# Patient Record
Sex: FEMALE | Race: WHITE | NOT HISPANIC OR LATINO | Employment: FULL TIME | URBAN - METROPOLITAN AREA
[De-identification: names, ages, dates, MRNs, and addresses within clinical notes are randomized per-mention and may not be internally consistent; named-entity substitution may affect disease eponyms.]

---

## 2022-09-27 ENCOUNTER — OFFICE VISIT (OUTPATIENT)
Dept: FAMILY MEDICINE CLINIC | Facility: CLINIC | Age: 37
End: 2022-09-27

## 2022-09-27 VITALS
SYSTOLIC BLOOD PRESSURE: 118 MMHG | HEART RATE: 88 BPM | TEMPERATURE: 97.8 F | BODY MASS INDEX: 23.32 KG/M2 | OXYGEN SATURATION: 98 % | WEIGHT: 140 LBS | HEIGHT: 65 IN | DIASTOLIC BLOOD PRESSURE: 70 MMHG

## 2022-09-27 DIAGNOSIS — J20.9 ACUTE BRONCHITIS, UNSPECIFIED ORGANISM: Primary | ICD-10-CM

## 2022-09-27 DIAGNOSIS — M54.2 NECK PAIN: ICD-10-CM

## 2022-09-27 DIAGNOSIS — R05.9 COUGH: ICD-10-CM

## 2022-09-27 PROCEDURE — FLEXERIL 10MG 21 FLEXERIL 10MG 21: Performed by: NURSE PRACTITIONER

## 2022-09-27 PROCEDURE — X34426: Performed by: NURSE PRACTITIONER

## 2022-09-27 PROCEDURE — 99204 OFFICE O/P NEW MOD 45 MIN: CPT | Performed by: NURSE PRACTITIONER

## 2022-09-27 RX ORDER — SARECYCLINE HYDROCHLORIDE 100 MG/1
10 TABLET, COATED ORAL DAILY
COMMUNITY
Start: 2022-09-15

## 2022-09-27 RX ORDER — LORATADINE 10 MG/1
10 TABLET ORAL DAILY
COMMUNITY

## 2022-09-27 RX ORDER — CYCLOBENZAPRINE HCL 10 MG
10 TABLET ORAL
Qty: 21 TABLET | Refills: 0
Start: 2022-09-27

## 2022-09-27 RX ORDER — AZITHROMYCIN 250 MG/1
TABLET, FILM COATED ORAL
Qty: 6 TABLET | Refills: 0
Start: 2022-09-27 | End: 2022-10-02

## 2022-09-27 RX ORDER — BENZONATATE 100 MG/1
100 CAPSULE ORAL 3 TIMES DAILY PRN
Qty: 20 CAPSULE | Refills: 0 | Status: SHIPPED | OUTPATIENT
Start: 2022-09-27

## 2022-09-27 NOTE — PATIENT INSTRUCTIONS
Please also take advil (2 tab) every 6-8 hours for 1-2 weeks for neck pain    Please follow-up at our office if symptoms fail to improve after 3 weeks of treatment

## 2022-09-27 NOTE — PROGRESS NOTES
Assessment/Plan:     Presentation consistent with acute bronchitis secondary to URI, viral vs bacterial  Due to length of symptoms and worsening progression, will treat as bacterial with azithromycin x 5 days  Tessalon perles also ordered for cough relief  Neck pain likely musculoskeletal in origin per presentation, no radiculopathy concerns  Acute cough may have caused or be contributing to condition  Flexeril 10mg given to patient form office pharmacy to be taken nighty for 2-3 weeks in addition to OTC advil prn  Encouraged patient to f/u with our office if this trial of NSAIDs + muscle relaxer fails to work  Discussed what signs and symptoms warrant emergent medical care  Patient verbalized understanding  Diagnoses and all orders for this visit:    Acute bronchitis, unspecified organism  -     azithromycin (Zithromax) 250 mg tablet; Take 2 tablets (500 mg total) by mouth daily for 1 day, THEN 1 tablet (250 mg total) daily for 4 days  Cough  -     benzonatate (TESSALON PERLES) 100 mg capsule; Take 1 capsule (100 mg total) by mouth 3 (three) times a day as needed for cough    Neck pain  -     cyclobenzaprine (FLEXERIL) 10 mg tablet; Take 1 tablet (10 mg total) by mouth daily at bedtime    Other orders  -     Seysara 100 MG TABS; Take 10 mg by mouth in the morning  -     loratadine (CLARITIN) 10 mg tablet; Take 10 mg by mouth daily        Subjective:     Patient ID: Rene Riley is a 40 y o  female  1 5 weeks ago: sore throat and cough  Sore throat gone but still has persistent cough  This past Friday develop neck pain (posterior), unchanged since onset  Did 3 covid test last week, all negative    Neck Pain  Patient complains of neck pain  Event that precipitated these symptoms: none known  Onset of symptoms 4 days ago, and have been unchanged since that time  Current symptoms are pain in posterior neck (aching in character; 5/10 in severity)  Patient denies numbness in UEs b/l   Patient has had no prior neck problems  Previous treatments include: none and massage, no help  Review of Systems   Constitutional: Positive for fatigue  HENT: Positive for postnasal drip  Negative for congestion, sinus pressure, sinus pain and sore throat  Respiratory: Positive for cough  Non productive cough   Cardiovascular: Negative  Gastrointestinal: Negative  Endocrine: Negative  Genitourinary: Negative  Musculoskeletal: Positive for neck pain and neck stiffness  Skin: Negative  Allergic/Immunologic: Negative  Neurological: Negative  Hematological: Positive for adenopathy  Psychiatric/Behavioral: Negative  Objective:     Physical Exam  Constitutional:       General: She is not in acute distress  Appearance: Normal appearance  HENT:      Right Ear: Tympanic membrane normal       Left Ear: Tympanic membrane normal       Nose: Nose normal  No congestion  Mouth/Throat:      Mouth: Mucous membranes are moist       Pharynx: No oropharyngeal exudate or posterior oropharyngeal erythema  Neck:      Comments: ROM was normal but patient reports pain with motion  No tenderness  Cardiovascular:      Rate and Rhythm: Normal rate and regular rhythm  Pulses: Normal pulses  Heart sounds: Normal heart sounds  Pulmonary:      Effort: Pulmonary effort is normal       Breath sounds: No wheezing  Musculoskeletal:         General: Normal range of motion  Cervical back: Normal range of motion  No rigidity or tenderness  Lymphadenopathy:      Cervical: Cervical adenopathy present  Skin:     General: Skin is warm and dry  Findings: No rash  Neurological:      Mental Status: She is alert and oriented to person, place, and time  Cranial Nerves: No cranial nerve deficit  Sensory: No sensory deficit  Motor: No weakness        Coordination: Coordination normal    Psychiatric:         Mood and Affect: Mood normal          Behavior: Behavior normal

## 2022-11-04 ENCOUNTER — OFFICE VISIT (OUTPATIENT)
Dept: FAMILY MEDICINE CLINIC | Facility: CLINIC | Age: 37
End: 2022-11-04

## 2022-11-04 VITALS
WEIGHT: 145 LBS | RESPIRATION RATE: 16 BRPM | OXYGEN SATURATION: 98 % | DIASTOLIC BLOOD PRESSURE: 74 MMHG | SYSTOLIC BLOOD PRESSURE: 122 MMHG | BODY MASS INDEX: 24.16 KG/M2 | HEART RATE: 74 BPM | HEIGHT: 65 IN

## 2022-11-04 DIAGNOSIS — J01.00 SUBACUTE MAXILLARY SINUSITIS: Primary | ICD-10-CM

## 2022-11-04 DIAGNOSIS — J30.1 NON-SEASONAL ALLERGIC RHINITIS DUE TO POLLEN: ICD-10-CM

## 2022-11-04 RX ORDER — SULFAMETHOXAZOLE AND TRIMETHOPRIM 800; 160 MG/1; MG/1
1 TABLET ORAL EVERY 12 HOURS SCHEDULED
Qty: 20 TABLET | Refills: 0 | Status: SHIPPED | OUTPATIENT
Start: 2022-11-04 | End: 2022-11-14

## 2022-11-04 RX ORDER — PREDNISONE 20 MG/1
TABLET ORAL
Qty: 15 TABLET | Refills: 0 | Status: SHIPPED | OUTPATIENT
Start: 2022-11-04

## 2022-11-04 NOTE — PROGRESS NOTES
[id number]         NAME: Alma Panchal is a 40 y o  female  : 1985    MRN: [id number]  DATE: 2022  TIME: 10:37 AM    Assessment and Plan   Subacute maxillary sinusitis [J01 00]  1  Subacute maxillary sinusitis  sulfamethoxazole-trimethoprim (BACTRIM DS) 800-160 mg per tablet    predniSONE 20 mg tablet   2  Non-seasonal allergic rhinitis due to pollen  predniSONE 20 mg tablet       No problem-specific Assessment & Plan notes found for this encounter  Patient Instructions     Patient Instructions   Continue Claritin once daily  Will use Bactrim twice daily for 10 days along with prednisone as ordered  Chief Complaint     Chief Complaint   Patient presents with   • Cold Like Symptoms     Cough, sinus congestion, headache since September- Covid Negative (2022)         History of Present Illness       Patient comes in today for persistent upper respiratory symptoms  Symptoms have been present over the last 5-6 weeks  Was seen urgent care in [facility] back in September  She did have some temporary relief  She now continues to have congestion  Does take Claritin on a daily basis for allergies  This seems worsen a normal allergic symptoms      Review of Systems   Review of Systems   Constitutional: Negative for chills and fever  HENT: Positive for postnasal drip, rhinorrhea and sore throat  Negative for ear pain  Respiratory: Positive for cough and wheezing  Negative for shortness of breath  Cardiovascular: Negative for chest pain  Musculoskeletal: Negative for myalgias  Skin: Negative for rash  Neurological: Positive for headaches           Current Medications       Current Outpatient Medications:   •  benzonatate (TESSALON PERLES) 100 mg capsule, Take 1 capsule (100 mg total) by mouth 3 (three) times a day as needed for cough, Disp: 20 capsule, Rfl: 0  •  loratadine (CLARITIN) 10 mg tablet, Take 10 mg by mouth daily, Disp: , Rfl:   •  predniSONE 20 mg tablet, 1 tab twice daily for 5 days, then 1 tab daily for 5 days  , Disp: 15 tablet, Rfl: 0  •  sulfamethoxazole-trimethoprim (BACTRIM DS) 800-160 mg per tablet, Take 1 tablet by mouth every 12 (twelve) hours for 10 days, Disp: 20 tablet, Rfl: 0    Current Allergies     Allergies as of 11/04/2022 - Reviewed 11/04/2022   Allergen Reaction Noted   • Amoxicillin Rash 09/27/2022            The following portions of the patient's history were reviewed and updated as appropriate: allergies, current medications, past family history, past medical history, past social history, past surgical history and problem list      Past Medical History:   Diagnosis Date   • Allergic 2000   • Headache(784 0) 2000       History reviewed  No pertinent surgical history  Family History   Problem Relation Age of Onset   • Heart disease Maternal Grandfather    • Diabetes Paternal Grandmother    • Cancer Paternal Grandmother          Medications have been verified  Objective   /74   Pulse 74   Resp 16   Ht 5' 5" (1 651 m)   Wt 65 8 kg (145 lb)   LMP 10/10/2022   SpO2 98%   BMI 24 13 kg/m²        Physical Exam     Physical Exam  Vitals reviewed  Constitutional:       General: She is not in acute distress  Appearance: She is well-developed  She is not ill-appearing  HENT:      Head: Normocephalic and atraumatic  Right Ear: Tympanic membrane and external ear normal  No drainage  Left Ear: Tympanic membrane normal  No drainage  Nose: Congestion present  Mouth/Throat:      Pharynx: No oropharyngeal exudate or posterior oropharyngeal erythema  Eyes:      General:         Right eye: No discharge  Left eye: No discharge  Extraocular Movements: Extraocular movements intact  Conjunctiva/sclera: Conjunctivae normal       Pupils: Pupils are equal, round, and reactive to light  Neck:      Thyroid: No thyromegaly     Cardiovascular:      Rate and Rhythm: Normal rate and regular rhythm  Heart sounds: Normal heart sounds  Pulmonary:      Effort: Pulmonary effort is normal  No respiratory distress  Breath sounds: No wheezing or rales  Musculoskeletal:      Cervical back: Normal range of motion and neck supple  Lymphadenopathy:      Cervical: No cervical adenopathy  Skin:     Findings: No rash  Neurological:      Mental Status: She is alert

## 2022-11-04 NOTE — PATIENT INSTRUCTIONS
Continue Claritin once daily  Will use Bactrim twice daily for 10 days along with prednisone as ordered

## 2023-04-27 ENCOUNTER — OFFICE VISIT (OUTPATIENT)
Dept: DERMATOLOGY | Facility: CLINIC | Age: 38
End: 2023-04-27

## 2023-04-27 VITALS — HEIGHT: 65 IN | TEMPERATURE: 98 F | BODY MASS INDEX: 24.66 KG/M2 | WEIGHT: 148 LBS

## 2023-04-27 DIAGNOSIS — L73.0 ACNE SCARRING: ICD-10-CM

## 2023-04-27 DIAGNOSIS — L70.0 ACNE VULGARIS: Primary | ICD-10-CM

## 2023-04-27 RX ORDER — SPIRONOLACTONE 100 MG/1
100 TABLET, FILM COATED ORAL DAILY
Status: CANCELLED | OUTPATIENT
Start: 2023-04-27

## 2023-04-27 NOTE — PATIENT INSTRUCTIONS
"ACNE VULGARIS (\"COMMON ACNE\")      Assessment and Plan: We reviewed the causes of acne, the “kinds” of acne, and the expected clinical course  We discussed treatment options ranging from over-the-counter products, topical retinoids, antibiotics, BP, hormonal therapies (OCPs/spironolactone), and isotretinoin (Accutane)  We reviewed specific over-the-counter interventions and medications  Recommended typical hygiene measures including water-based facial products, washing regularly with mild cleanser, and refraining from picking and popping any pimples  Recommended non-comedogenic sunscreen use daily  Expectations of therapy discussed  Side effects, risks and benefits of medications discussed  A comprehensive handout on Acne was provided  The phone number to call in case of questions or concerns (and instructions to stop medications in such a scenario) was provided  After lengthy discussion of etiology and treatment options, we decided to implement the following personalized treatment plan:  Recommended starting topical compound cream prescribed through [email or web address]   [facility]     We use this service to prescribe medications that are often not covered by insurance  Your physician will send your prescription to the pharmacy  You will receive an email from [email or web address] where you will follow the instructions within the email to provide your billing and shipping information  Your medicine will be shipped directly to you  If you have any questions, you can call [phone] or email [email or web address]  Discussed option of Spironolactone with risks and benefits reviewed- to avoid excessive amounts of potassium in diet  Complete lab work      Based on a thorough discussion of this condition and the management approach to it (including a comprehensive discussion of the known risks, side effects and potential benefits of treatment), the patient (family) agrees to implement the " following specific plan:    --------------------------------------------------------------------------------------  YOUR PERSONALIZED ACNE ACTION PLAN    “MORNING ROUTINE”    SKIN HYGIENE:  In the shower, wash your face, chest and back gently with Cetaphil moisturizing cleanser or Dove Fragrance-free bar  Do not use a luffa or washcloth as these tend to be too irritating to acne-prone skin  “EVENING ROUTINE”    SKIN HYGIENE:  In the shower, wash your face, chest and back gently with Cetaphil moisturizing cleanser or Dove Fragrance-free bar  Do not use a lufa or washcloth as these tend to be too irritating to acne-prone skin  TOPICAL CREAM to apply as directed        REMEMBER:  Always take your acne pills with lots of water! A pill stuck in your throat can cause significant burning and irritation  Drink a full glass of water to ensure the pill gets into your stomach  Avoid “popping” a pill right before bed, and stay upright for at least 1 hour after taking a pill  ACNE:  WHAT ZIT ALL ABOUT? WHY DO I HAVE ACNE/PIMPLES? Your skin is made of layers  To keep the skin from becoming dry and cracked, the skin needs oil  The oil is made in little wells in the deeper layers in the skin  People with acne have glands that make more oil and are more easily plugged, causing the glands to swell  Hormones, bacteria and your inherited tendency to have acne all play a role  The medical term for “pimples” is acne or acne vulgaris (vulgaris means “common”)  Most people get some acne  Acne does not come from being dirty  Instead, it is an expected consequence of changes that occur during normal growth and development  Hormones, bacteria, and your family's tendency to have acne may all play a role  “Whiteheads” or “blackheads” are openings of the glands (glands are the oil factories) onto the surface of the skin   “Blackheads” are not caused by dirt blocking the pores; instead, they result from the oxidation reaction of oil and skin in the pores with the air (like a “rust” reaction)  WHAT ABOUT STRESS? Stress does not “cause” acne but it can make it worse  Make sure you get enough sleep and daily exercise! WHAT ABOUT FOODS/DIET? Try to eat a balanced, healthy diet  Some people feel that certain foods worsen their acne  While there aren't many studies available on this question, severe dietary changes are unlikely to help your acne and may be harmful to the health of your skin  If you find that a certain food seems to aggravate your acne, you may consider avoiding that food  Discuss this with your physician! WHAT CAUSES MY ACNE? There are four contributors to acne--the body's natural oil (sebum), clogged pores, bacteria (with the scientific name Propionibacterium acnes, or P  acnes, for short), and the body's reaction to the bacteria living in the clogged pores (which causes inflammation)  Here's what happens:    Sebum is produced in the normal oil-making glands in the deeper layers of the skin and reaches the surface through the skin's pores  An increase in certain hormones occurs around the time of puberty, and these hormones trigger the oil glands to produce increased amounts of sebum  Pores with excess oil tend to become clogged more easily  At the same time, P  acnes--one of the many types of bacteria that normally live on everyone's skin--thrives in the excess oil and causes a skin reaction (inflammation)  If a pore is clogged close to the surface, there is little inflammation  However, this results in the formation of “whiteheads” (closed comedones) or “blackheads” (open comedones) at the surface of the skin  A plug that extends to, or forms a little deeper in the pore, or one that enlarges or ruptures may cause more inflammation  The result is red bumps (papules) and pus-filled pimples (pustules)    If plugging happens in the deepest skin layer, the inflammation may be even more severe, resulting in the formation of nodules or cysts  When these types of acne heal, they may leave behind discolored areas or true scars  SKIN HYGIENE:  HOW SHOULD I [address] MY SKIN? Acne does not come from being dirty, however, washing your face is part of taking good care of your skin and will help keep your face clear  Good skin hygiene is, therefore, critical to support any acne treatment plan  Here are several specific suggestions for practicing good skin hygiene and keeping your skin looking its best:    You should wash acne-prone skin TWICE A DAY: Once in the morning and once in the evening  This does include any showers you take that day, so do not overdo it! Do not scrub the skin with a washcloth or loofah as these can irritate and inflame your acne  Acne does not come from “dirt”, so it is not necessary to scrub the skin clean  In fact, scrubbing may lead to dryness and irritation that makes the acne even worse and harder for patients to tolerate acne medications  Use a gentle facial moisturizing cleanser (Cetaphil Moisturizing Cleanser or Dove Fragrance-Free bar)  Avoid using soaps like Nichol Glover, Wilian Haskins [address], [address], or soft/liquid soaps as these products will dry your skin  Do not use any over-the-counter “acne washes” without your doctor's specific instruction to do so  These products often contain salicylic acid or benzoyl peroxide  These ingredients can be helpful in clearing oil from the skin and reducing bacteria, but they may also be drying and can add to irritation  Do not use exfoliating products with microbeads or brushes as these can cause irritation to the skin  Facials and other treatments to remove, squeeze, or “clean out” pores are not recommended  Manipulating the skin in this way can make acne worse and can lead to severe infections and/or scarring  It also increases the likelihood that the skin will not be able to tolerate acne medications     Try not to “pop pimples” or pick at your acne as this can delay healing and may result in scarring or skin color changes (“dark spots”) that are often more noticeable than the acne itself  Picking/popping acne can also cause a serious skin infection  Wash or change your pillow case once to twice a week, especially if you use products in your hair  Wash the skin as soon as possible after playing sports or other activities that cause a lot of sweating  Also, pay attention to how your sports equipment (shoulder pads, helmet strap, etc ) might be making your acne worse  When you use makeup, moisturizer, or sunscreen make sure that these products are labeled “non-comedogenic,” or “won't clog pores,” or “won't cause acne ”       SHOULD I TREAT MY ACNE? There are a number of other skin conditions that can look like acne  If there is any question about the diagnosis, then the person should be evaluated by a board certified pediatric and adolescent dermatologist   A physician should examine any child with acne who is between the ages of 3and 9years of age, as acne in this “mid-childhood” age group is not normal and may signal an underlying problem  If a “preadolescent” (9to 6years of age) or “adolescent” (15to 25years of age) has mild acne and the condition is not bothersome to the individual, proper and regular skin care (what your doctor may call “skin hygiene”) may be all that is needed at this point  Many people do, however, need specific acne medications to help their skin look and feel its best  Your doctor will tell you if you are one of these people  If so, you may be advised to use an over-the-counter or prescription medication that is applied to the skin (a “topical medication”) or if the addition of an oral medication (a medication “taken by [employer]) is needed  The good news is that the medications work well when used properly! Some specific factors that may influence the choice of acne therapy include:    Severity   The number and type of skin lesions (papules or comedones) and the degree of inflammation (mild, moderate or severe)  Scarring  Scarring is most common when acne is severe, but it can happen even in children with mild acne  Impact  If a child is experiencing emotional complications because of the acne or is experiencing negative comments from other children  Cost of the acne medications  An acne expert can help to keep “out of pocket” costs to a minimum by utilizing the correct medications and the least expensive options  The patient's skin type (“oily” versus “dry” or “combination skin,” for example)  Potential side effects of the medication  The ease or overall complexity of the treatment plan or medication  WHAT ACNE TREATMENTS ARE AVAILABLE? Medications for acne try to stop the formation of new pimples by reducing or removing the oil, bacteria, and other things (like dead skin cells) that clog the pores  They can also decrease the inflammation or irritation response of the skin to bacteria  It may take from 6 to 8 weeks (about 2 months!) before you see any improvement and know if the medication is effective  It takes the layers of skin this long to regenerate  Remember, these medications do not “cure” the condition--the acne improves because of the medication  Therefore, treatment must be continued in order to prevent the return of acne lesions  There are many types of acne treatments  Some are applied to the skin (“topical” medications) and some are taken by mouth (“oral” medications)  In most cases of mild acne, the doctor will start with a topical medication  There are many different topical medications that are helpful for acne    If acne is more severe and it does not respond adequately to a topical medication, or if it covers large body surface areas such as the back and/or chest, oral antibiotics such as Doxycycline or Minocycline and/or oral hormone therapy such as Oral Contraceptive Pills or Spironolactone may be prescribed  In the most severe cases, isotretinoin (Accutane) may be used  In general, it is usually best to start with acne medications that are least likely to cause side effects but are at the same time capable of addressing the specific causes for the acne  Some patients have a good result with just one medication, but many will need to use a combination of treatments: two or more different topical agents or an oral medication plus a topical medication  Another treatment used for acne may include corticosteroid injections, which are used to help relieve pain, decrease the size, and encourage the healing of large, inflamed acne nodules  Also, dermatologists sometimes perform “acne surgery,” using a fine needle, a pointed blade, or an instrument known as a comedone extractor to mechanically clean out clogged pores  One must always weigh the risk for inducing a scar with the potential benefits of any procedure  Prior treatment with topical retinoids can “loosen” whiteheads and blackheads and make it easier to physically remove such lesions  Heat-based devices, and light and laser therapy are being studied to see whether there is any role for such treatments in mild to moderate acne  At this time, there is not enough evidence to make general recommendations about their use  TOPICAL ACNE MEDICATIONS    WHAT KIND OF TOPICALS ARE THERE? Benzoyl peroxide (BP) helps to fight inflammation and is anti-microbial (kills bacteria, viruses, and other microorganisms) and is believed to help prevent resistance of bacteria to topical antibiotics  A benzoyl peroxide “wash” may be recommended for use on large areas such as the chest and/or back  Mild irritation and dryness are common when first using benzoyl peroxide-containing products  Be careful because benzoyl peroxide can bleach towels and clothing!   Retinoids (such as adapalene, tretinoin, or tazarotene) unplug the oil glands by helping peel away the layers of skin and other things plugging the opening of the glands  Mild irritation and dryness are common when first using these products  Facial waxing and other skin procedures can lead to excessive irritation and should be avoided during retinoid therapy  Antibiotics fight bacteria and help decrease inflammation  Topical antibiotics commonly used in acne include clindamycin, erythromycin, and combination agents (such as clindamycin/benzoyl peroxide or erythromycin/benzoyl peroxide)  Mild irritation and dryness are common when first using these products  Typically, topical antibiotics should not be used alone as treatment for acne  Other topical agents include salicylic acid, azelaic acid, dapsone, and sulfacetamide  Mild irritation and dryness can also occur when first using these products  USING YOUR TOPICAL TREATMENTS LIKE A PRO  Apply topical medications only to clean, dry skin  Topical medications may lead to significant dryness of the affected areas  To minimize this, wait 15-20 minutes after washing before applying your topical medication  These medications work deep in the skin to prevent new breakouts  “Spot treatment” of individual pimples does not do much  When applying topical medications to the face, use the “5-dot” method  Start by placing a small pea-sized amount of the medication on your finger  Then, place “dots” in each of five locations of your face: Mid-forehead, each cheek, nose, and chin  Next, rub the medication into the entire area of skin - not just on individual pimples! Try to avoid the delicate skin around your eyes and corners of your mouth  The medications are not magic! They take weeks if not months to work  Be patient and use your medicine on a daily basis or as directed for six weeks before asking if your skin looks better  Try not to miss more than one or two days each week when using your medications    If you are starting a new medication, then try using it “every other night” or even “every third night ” Gradually work up to [facility] a day ”  This will give your skin time to adjust   The same medications often come in various forms or formulations: Creams, ointments, lotions, gels, microspheres, or foams  Use the formulation that has been recommended and don't switch to other forms unless instructed  Some forms (such as alcohol based gels) may be more drying and less tolerable for certain skin types  Sometimes individual medications are not as effective as a combination of two or more agents  The doctor may need to try several medications or combinations before finding the one that is best for that patient  Moisturizer, sunscreen, and make-up may be used in conjunction with topical acne medications  In general, acne medications are applied first so they may directly contact the skin  Ask your physician to review specific application instructions! It is especially important to always use sunscreen when using a topical retinoid or oral antibiotic  These drugs can make your skin more sensitive to the sun  In general, sunscreen gets applied AFTER any acne medications  Don't stop using your acne medications just because your acne got better  Remember, the acne is better because of the medication, and prevention is the key to treatment  HORMONAL THERAPY  Hormonal treatment is used only in females and usually consists of oral contraceptives (birth control pills)  Spironolactone is also sometimes used  HAVING PROBLEMS WITH ANY OF YOUR TREATMENTS? You should not be able to see any of the medicines on your face  If you can see a white film on your skin after you apply the medication, there is too much medicine in that area and you need to apply a thinner coat and make sure it is spread evenly on your face        If your skin gets too dry, you can apply a light (“non-comedogenic”) moisturizer on top of your medicine or you may switch to using the medicine every other day instead of every day  If your skin is still too irritated, you may need to switch to a milder medication  If your skin is red and very itchy, you may be allergic to the medication and you should stop using it  COMMON POSSIBLE SIDE EFFECTS OF MEDICATIONS    Retinoids - dryness, redness, increased sun sensitivity  Benzoyl peroxide - drying, redness, bleaching of clothes, towels and sheets, allergy  WHEN AND WHERE TO CALL WITH CONCERNS  We are here to help! If you experience any unusual symptoms, then stop taking or using the medication and call our office at [phone] (SKIN)  It is better to be safe than to be sorry!

## 2023-04-27 NOTE — PROGRESS NOTES
"[facility] Note     Patient Name: Tom Herbert  Encounter Date: 4/27/2023     Have you been cared for by a [facility] Dermatologist in the last 3 years and, if so, which description applies to you? NO  I am considered a \"new\" patient and must complete all patient intake questions  I am FEMALE/not capable of bearing children  REVIEW OF SYSTEMS:  Have you recently had or currently have any of the following? · Recent fever or chills? No  · Any non-healing wound? No   PAST MEDICAL HISTORY:  Have you personally ever had or currently have any of the following? If \"YES,\" then please provide more detail  · Skin cancer (such as Melanoma, Basal Cell Carcinoma, Squamous Cell Carcinoma? No  · Tuberculosis, HIV/AIDS, Hepatitis B or C: No  · Systemic Immunosuppression such as Diabetes, Biologic or Immunotherapy, Chemotherapy, Organ Transplantation, Bone Marrow Transplantation No  · Radiation Treatment No   FAMILY HISTORY:  Any \"first degree relatives\" (parent, brother, sister, or child) with the following? • Skin Cancer, Pancreatic or Other Cancer? YES, father - had area on face surgically removed    PATIENT EXPERIENCE:    • Do you want the Dermatologist to perform a COMPLETE skin exam today including a clinical examination under the \"bra and underwear\" areas? • If necessary, do we have your permission to call and leave a detailed message on your Preferred Phone number that includes your specific medical information? Yes      Allergies   Allergen Reactions   • Amoxicillin Rash      Current Outpatient Medications:   •  benzonatate (TESSALON PERLES) 100 mg capsule, Take 1 capsule (100 mg total) by mouth 3 (three) times a day as needed for cough, Disp: 20 capsule, Rfl: 0  •  loratadine (CLARITIN) 10 mg tablet, Take 10 mg by mouth daily, Disp: , Rfl:   •  predniSONE 20 mg tablet, 1 tab twice daily for 5 days, then 1 tab daily for 5 days  , Disp: 15 tablet, Rfl: 0          • Whom besides the " "patient is providing clinical information about today's encounter?   o NO ADDITIONAL HISTORIAN (patient alone provided history)    Physical Exam and Assessment/Plan by Diagnosis:    ACNE VULGARIS (\"COMMON ACNE\")    Physical Exam:  • Psychiatric/Mood:  • Anatomic Location Affected: Face and cystic acne behind ears  • Morphological Description:              -   • Pertinent Positives:  • Pertinent Negatives: Additional History of Present Condition:  Patient presents with history of acne since age 15  Patient used antibiotic sicera; in past tried retinols cream, antibiotic, pimple patches  Assessment and Plan:  • We reviewed the causes of acne, the “kinds” of acne, and the expected clinical course  • We discussed treatment options ranging from over-the-counter products, topical retinoids, antibiotics, BP, hormonal therapies (OCPs/spironolactone), and isotretinoin (Accutane)  • We reviewed specific over-the-counter interventions and medications  Recommended typical hygiene measures including water-based facial products, washing regularly with mild cleanser, and refraining from picking and popping any pimples  • Recommended non-comedogenic sunscreen use daily  • Expectations of therapy discussed  Side effects, risks and benefits of medications discussed  • A comprehensive handout on Acne was provided  • The phone number to call in case of questions or concerns (and instructions to stop medications in such a scenario) was provided  • After lengthy discussion of etiology and treatment options, we decided to implement the following personalized treatment plan:  • Recommended starting topical compound cream prescribed through [facility]   [facility]     We use this service to prescribe medications that are often not covered by insurance  Your physician will send your prescription to the pharmacy  You will receive an email from www [email or web address] where you will follow the instructions " within the email to provide your billing and shipping information  Your medicine will be shipped directly to you  If you have any questions, you can call [phone] or email [email or web address]  Tretinoin: 0 025%  Niacinamide: 2%  Azelaic Acid: 8%  Sodium Hyaluronate: 0 25%  Vehicle: Cream       To consider [place]  • Discussed option of spironolactone with risks and benefits reviewed- to avoid excessive amounts of potassium in diet  • Complete lab work  First     Based on a thorough discussion of this condition and the management approach to it (including a comprehensive discussion of the known risks, side effects and potential benefits of treatment), the patient (family) agrees to implement the following specific plan:    --------------------------------------------------------------------------------------  YOUR PERSONALIZED ACNE ACTION PLAN    “MORNING ROUTINE”    1) SKIN HYGIENE:  In the shower, wash your face, chest and back gently with Cetaphil moisturizing cleanser or Dove Fragrance-free bar  Do not use a luffa or washcloth as these tend to be too irritating to acne-prone skin  “EVENING ROUTINE”    1) SKIN HYGIENE:  In the shower, wash your face, chest and back gently with Cetaphil moisturizing cleanser or Dove Fragrance-free bar  Do not use a lufa or washcloth as these tend to be too irritating to acne-prone skin  2) TOPICAL CREAM to apply as directed        REMEMBER:  Always take your acne pills with lots of water! A pill stuck in your throat can cause significant burning and irritation  Drink a full glass of water to ensure the pill gets into your stomach  Avoid “popping” a pill right before bed, and stay upright for at least 1 hour after taking a pill  ACNE:  WHAT ZIT ALL ABOUT? WHY DO I HAVE ACNE/PIMPLES? Your skin is made of layers  To keep the skin from becoming dry and cracked, the skin needs oil  The oil is made in little wells in the deeper layers in the skin  People with acne have glands that make more oil and are more easily plugged, causing the glands to swell  Hormones, bacteria and your inherited tendency to have acne all play a role  The medical term for “pimples” is acne or acne vulgaris (vulgaris means “common”)  Most people get some acne  Acne does not come from being dirty  Instead, it is an expected consequence of changes that occur during normal growth and development  Hormones, bacteria, and your family's tendency to have acne may all play a role  “Whiteheads” or “blackheads” are openings of the glands (glands are the oil factories) onto the surface of the skin  “Blackheads” are not caused by dirt blocking the pores; instead, they result from the oxidation reaction of oil and skin in the pores with the air (like a “rust” reaction)  WHAT ABOUT STRESS? Stress does not “cause” acne but it can make it worse  Make sure you get enough sleep and daily exercise! WHAT ABOUT FOODS/DIET? Try to eat a balanced, healthy diet  Some people feel that certain foods worsen their acne  While there aren't many studies available on this question, severe dietary changes are unlikely to help your acne and may be harmful to the health of your skin  If you find that a certain food seems to aggravate your acne, you may consider avoiding that food  Discuss this with your physician! WHAT CAUSES MY ACNE? There are four contributors to acne--the body's natural oil (sebum), clogged pores, bacteria (with the scientific name Propionibacterium acnes, or P  acnes, for short), and the body's reaction to the bacteria living in the clogged pores (which causes inflammation)  Here's what happens:    • Sebum is produced in the normal oil-making glands in the deeper layers of the skin and reaches the surface through the skin's pores   An increase in certain hormones occurs around the time of puberty, and these hormones trigger the oil glands to produce increased amounts of sebum   • Pores with excess oil tend to become clogged more easily  • At the same time, P  acnes--one of the many types of bacteria that normally live on everyone's skin--thrives in the excess oil and causes a skin reaction (inflammation)  • If a pore is clogged close to the surface, there is little inflammation  However, this results in the formation of “whiteheads” (closed comedones) or “blackheads” (open comedones) at the surface of the skin  • A plug that extends to, or forms a little deeper in the pore, or one that enlarges or ruptures may cause more inflammation  The result is red bumps (papules) and pus-filled pimples (pustules)  • If plugging happens in the deepest skin layer, the inflammation may be even more severe, resulting in the formation of nodules or cysts  When these types of acne heal, they may leave behind discolored areas or true scars  SKIN HYGIENE:  HOW SHOULD I [facility] MY SKIN? Acne does not come from being dirty, however, washing your face is part of taking good care of your skin and will help keep your face clear  Good skin hygiene is, therefore, critical to support any acne treatment plan  Here are several specific suggestions for practicing good skin hygiene and keeping your skin looking its best:    • You should wash acne-prone skin TWICE A DAY: Once in the morning and once in the evening  This does include any showers you take that day, so do not overdo it! • Do not scrub the skin with a washcloth or loofah as these can irritate and inflame your acne  Acne does not come from “dirt”, so it is not necessary to scrub the skin clean  In fact, scrubbing may lead to dryness and irritation that makes the acne even worse and harder for patients to tolerate acne medications  • Use a gentle facial moisturizing cleanser (Cetaphil Moisturizing Cleanser or Dove Fragrance-Free bar)    Avoid using soaps like [address], [place] Spring, Lever, or soft/liquid soaps as these products will dry your skin   • Do not use any over-the-counter “acne washes” without your doctor's specific instruction to do so  These products often contain salicylic acid or benzoyl peroxide  These ingredients can be helpful in clearing oil from the skin and reducing bacteria, but they may also be drying and can add to irritation  • Do not use exfoliating products with microbeads or brushes as these can cause irritation to the skin  • Facials and other treatments to remove, squeeze, or “clean out” pores are not recommended  Manipulating the skin in this way can make acne worse and can lead to severe infections and/or scarring  It also increases the likelihood that the skin will not be able to tolerate acne medications  • Try not to “pop pimples” or pick at your acne as this can delay healing and may result in scarring or skin color changes (“dark spots”) that are often more noticeable than the acne itself  Picking/popping acne can also cause a serious skin infection  • Wash or change your pillow case once to twice a week, especially if you use products in your hair  • Wash the skin as soon as possible after playing sports or other activities that cause a lot of sweating  Also, pay attention to how your sports equipment (shoulder pads, helmet strap, etc ) might be making your acne worse  • When you use makeup, moisturizer, or sunscreen make sure that these products are labeled “non-comedogenic,” or “won't clog pores,” or “won't cause acne ”       SHOULD I TREAT MY ACNE? There are a number of other skin conditions that can look like acne  If there is any question about the diagnosis, then the person should be evaluated by a board certified pediatric and adolescent dermatologist   A physician should examine any child with acne who is between the ages of 3and 9years of age, as acne in this “mid-childhood” age group is not normal and may signal an underlying problem     If a “preadolescent” (9to 6years of age) or “adolescent” (15to 25years of age) has mild acne and the condition is not bothersome to the individual, proper and regular skin care (what your doctor may call “skin hygiene”) may be all that is needed at this point  Many people do, however, need specific acne medications to help their skin look and feel its best  Your doctor will tell you if you are one of these people  If so, you may be advised to use an over-the-counter or prescription medication that is applied to the skin (a “topical medication”) or if the addition of an oral medication (a medication “taken by [employer]) is needed  The good news is that the medications work well when used properly! Some specific factors that may influence the choice of acne therapy include:    • Severity  The number and type of skin lesions (papules or comedones) and the degree of inflammation (mild, moderate or severe)  • Scarring  Scarring is most common when acne is severe, but it can happen even in children with mild acne  • Impact  If a child is experiencing emotional complications because of the acne or is experiencing negative comments from other children  • Cost of the acne medications  An acne expert can help to keep “out of pocket” costs to a minimum by utilizing the correct medications and the least expensive options  • The patient's skin type (“oily” versus “dry” or “combination skin,” for example)  • Potential side effects of the medication  • The ease or overall complexity of the treatment plan or medication  WHAT ACNE TREATMENTS ARE AVAILABLE? Medications for acne try to stop the formation of new pimples by reducing or removing the oil, bacteria, and other things (like dead skin cells) that clog the pores  They can also decrease the inflammation or irritation response of the skin to bacteria  It may take from 6 to 8 weeks (about 2 months!) before you see any improvement and know if the medication is effective   It takes the layers of skin this long to regenerate  Remember, these medications do not “cure” the condition--the acne improves because of the medication  Therefore, treatment must be continued in order to prevent the return of acne lesions  There are many types of acne treatments  Some are applied to the skin (“topical” medications) and some are taken by mouth (“oral” medications)  In most cases of mild acne, the doctor will start with a topical medication  There are many different topical medications that are helpful for acne  If acne is more severe and it does not respond adequately to a topical medication, or if it covers large body surface areas such as the back and/or chest, oral antibiotics such as Doxycycline or Minocycline and/or oral hormone therapy such as Oral Contraceptive Pills or Spironolactone may be prescribed  In the most severe cases, isotretinoin (Accutane) may be used  In general, it is usually best to start with acne medications that are least likely to cause side effects but are at the same time capable of addressing the specific causes for the acne  Some patients have a good result with just one medication, but many will need to use a combination of treatments: two or more different topical agents or an oral medication plus a topical medication  Another treatment used for acne may include corticosteroid injections, which are used to help relieve pain, decrease the size, and encourage the healing of large, inflamed acne nodules  Also, dermatologists sometimes perform “acne surgery,” using a fine needle, a pointed blade, or an instrument known as a comedone extractor to mechanically clean out clogged pores  One must always weigh the risk for inducing a scar with the potential benefits of any procedure  Prior treatment with topical retinoids can “loosen” whiteheads and blackheads and make it easier to physically remove such lesions       Heat-based devices, and light and laser therapy are being studied to see whether there is any role for such treatments in mild to moderate acne  At this time, there is not enough evidence to make general recommendations about their use  TOPICAL ACNE MEDICATIONS    WHAT KIND OF TOPICALS ARE THERE? • Benzoyl peroxide (BP) helps to fight inflammation and is anti-microbial (kills bacteria, viruses, and other microorganisms) and is believed to help prevent resistance of bacteria to topical antibiotics  A benzoyl peroxide “wash” may be recommended for use on large areas such as the chest and/or back  Mild irritation and dryness are common when first using benzoyl peroxide-containing products  Be careful because benzoyl peroxide can bleach towels and clothing! • Retinoids (such as adapalene, tretinoin, or tazarotene) unplug the oil glands by helping peel away the layers of skin and other things plugging the opening of the glands  Mild irritation and dryness are common when first using these products  Facial waxing and other skin procedures can lead to excessive irritation and should be avoided during retinoid therapy  • Antibiotics fight bacteria and help decrease inflammation  Topical antibiotics commonly used in acne include clindamycin, erythromycin, and combination agents (such as clindamycin/benzoyl peroxide or erythromycin/benzoyl peroxide)  Mild irritation and dryness are common when first using these products  Typically, topical antibiotics should not be used alone as treatment for acne  • Other topical agents include salicylic acid, azelaic acid, dapsone, and sulfacetamide  Mild irritation and dryness can also occur when first using these products  USING YOUR TOPICAL TREATMENTS LIKE A PRO  • Apply topical medications only to clean, dry skin  Topical medications may lead to significant dryness of the affected areas  To minimize this, wait 15-20 minutes after washing before applying your topical medication  • These medications work deep in the skin to prevent new breakouts   “Spot treatment” of individual pimples does not do much  When applying topical medications to the face, use the “5-dot” method  Start by placing a small pea-sized amount of the medication on your finger  Then, place “dots” in each of five locations of your face: Mid-forehead, each cheek, nose, and chin  Next, rub the medication into the entire area of skin - not just on individual pimples! Try to avoid the delicate skin around your eyes and corners of your mouth  • The medications are not magic! They take weeks if not months to work  Be patient and use your medicine on a daily basis or as directed for six weeks before asking if your skin looks better  Try not to miss more than one or two days each week when using your medications  • If you are starting a new medication, then try using it “every other night” or even “every third night ” Gradually work up to [employer] a day ”  This will give your skin time to adjust   • The same medications often come in various forms or formulations: Creams, ointments, lotions, gels, microspheres, or foams  Use the formulation that has been recommended and don't switch to other forms unless instructed  Some forms (such as alcohol based gels) may be more drying and less tolerable for certain skin types  • Sometimes individual medications are not as effective as a combination of two or more agents  The doctor may need to try several medications or combinations before finding the one that is best for that patient  • Moisturizer, sunscreen, and make-up may be used in conjunction with topical acne medications  In general, acne medications are applied first so they may directly contact the skin  Ask your physician to review specific application instructions! • It is especially important to always use sunscreen when using a topical retinoid or oral antibiotic  These drugs can make your skin more sensitive to the sun  In general, sunscreen gets applied AFTER any acne medications    • Don't stop using your acne medications just because your acne got better  Remember, the acne is better because of the medication, and prevention is the key to treatment  HORMONAL THERAPY  Hormonal treatment is used only in females and usually consists of oral contraceptives (birth control pills)  Spironolactone is also sometimes used  HAVING PROBLEMS WITH ANY OF YOUR TREATMENTS? You should not be able to see any of the medicines on your face  If you can see a white film on your skin after you apply the medication, there is too much medicine in that area and you need to apply a thinner coat and make sure it is spread evenly on your face  If your skin gets too dry, you can apply a light (“non-comedogenic”) moisturizer on top of your medicine or you may switch to using the medicine every other day instead of every day  If your skin is still too irritated, you may need to switch to a milder medication  If your skin is red and very itchy, you may be allergic to the medication and you should stop using it  COMMON POSSIBLE SIDE EFFECTS OF MEDICATIONS    • Retinoids - dryness, redness, increased sun sensitivity  • Benzoyl peroxide - drying, redness, bleaching of clothes, towels and sheets, allergy  WHEN AND WHERE TO CALL WITH CONCERNS  We are here to help! If you experience any unusual symptoms, then stop taking or using the medication and call our office at [phone] (SKIN)  It is better to be safe than to be sorry!       Scribe Attestation    I,:  Excell Leader am acting as a scribe while in the presence of the attending physician :       I,:  Duc Acevedo MD personally performed the services described in this documentation    as scribed in my presence :

## 2023-04-28 ENCOUNTER — TELEPHONE (OUTPATIENT)
Dept: DERMATOLOGY | Facility: CLINIC | Age: 38
End: 2023-04-28

## 2023-04-28 DIAGNOSIS — L70.0 ACNE VULGARIS: Primary | ICD-10-CM

## 2023-04-28 RX ORDER — SPIRONOLACTONE 25 MG/1
25 TABLET ORAL DAILY
Qty: 90 TABLET | Refills: 0 | Status: SHIPPED | OUTPATIENT
Start: 2023-04-28 | End: 2023-07-27 | Stop reason: SDUPTHER

## 2023-04-28 NOTE — TELEPHONE ENCOUNTER
I have left a detailed message for the patient informing her that Dr Christopher Ludwig has sent in her Spironolactone and for her to call back if she has any questions or concerns

## 2023-07-24 DIAGNOSIS — L70.0 ACNE VULGARIS: ICD-10-CM

## 2023-07-24 RX ORDER — SPIRONOLACTONE 25 MG/1
TABLET ORAL
Qty: 90 TABLET | Refills: 0 | OUTPATIENT
Start: 2023-07-24

## 2023-07-27 ENCOUNTER — OFFICE VISIT (OUTPATIENT)
Dept: DERMATOLOGY | Facility: CLINIC | Age: 38
End: 2023-07-27
Payer: COMMERCIAL

## 2023-07-27 VITALS — TEMPERATURE: 97.8 F | HEIGHT: 65 IN | BODY MASS INDEX: 24.53 KG/M2 | WEIGHT: 147.2 LBS

## 2023-07-27 DIAGNOSIS — L70.0 ACNE VULGARIS: Primary | ICD-10-CM

## 2023-07-27 PROCEDURE — 99214 OFFICE O/P EST MOD 30 MIN: CPT | Performed by: DERMATOLOGY

## 2023-07-27 RX ORDER — SPIRONOLACTONE 50 MG/1
50 TABLET, FILM COATED ORAL DAILY
Qty: 90 TABLET | Refills: 0 | Status: SHIPPED | OUTPATIENT
Start: 2023-07-27

## 2023-07-27 NOTE — PATIENT INSTRUCTIONS
1. ACNE VULGARIS ("COMMON ACNE")     Assessment and Plan: We reviewed the causes of acne, the “kinds” of acne, and the expected clinical course. We discussed treatment options ranging from over-the-counter products, topical retinoids, antibiotics, BP, hormonal therapies (OCPs/spironolactone), and isotretinoin (Accutane). We reviewed specific over-the-counter interventions and medications. Recommended typical hygiene measures including water-based facial products, washing regularly with mild cleanser, and refraining from picking and popping any pimples. Recommended non-comedogenic sunscreen use daily. Expectations of therapy discussed. Side effects, risks and benefits of medications discussed. A comprehensive handout on Acne was provided. The phone number to call in case of questions or concerns (and instructions to stop medications in such a scenario) was provided. After lengthy discussion of etiology and treatment options, we decided to implement the following personalized treatment plan:  Continue skin medicinals topical medication- please let us know if you need a refill on this medication  Please increase Spironolactone to 50 mg tablets: take 1 tablet daily  Follow up in 4-6 months      Based on a thorough discussion of this condition and the management approach to it (including a comprehensive discussion of the known risks, side effects and potential benefits of treatment), the patient (family) agrees to implement the following specific plan:        REMEMBER:  Always take your acne pills with lots of water! A pill stuck in your throat can cause significant burning and irritation. Drink a full glass of water to ensure the pill gets into your stomach. Avoid “popping” a pill right before bed, and stay upright for at least 1 hour after taking a pill. ACNE:  WHAT ZIT ALL ABOUT? WHY DO I HAVE ACNE/PIMPLES? Your skin is made of layers.   To keep the skin from becoming dry and cracked, the skin needs oil. The oil is made in little wells in the deeper layers in the skin. People with acne have glands that make more oil and are more easily plugged, causing the glands to swell. Hormones, bacteria and your inherited tendency to have acne all play a role. The medical term for “pimples” is acne or acne vulgaris (vulgaris means “common”). Most people get some acne. Acne does not come from being dirty. Instead, it is an expected consequence of changes that occur during normal growth and development. Hormones, bacteria, and your family's tendency to have acne may all play a role. “Whiteheads” or “blackheads” are openings of the glands (glands are the oil factories) onto the surface of the skin. “Blackheads” are not caused by dirt blocking the pores; instead, they result from the oxidation reaction of oil and skin in the pores with the air (like a “rust” reaction). WHAT ABOUT STRESS? Stress does not “cause” acne but it can make it worse. Make sure you get enough sleep and daily exercise! WHAT ABOUT FOODS/DIET? Try to eat a balanced, healthy diet. Some people feel that certain foods worsen their acne. While there aren't many studies available on this question, severe dietary changes are unlikely to help your acne and may be harmful to the health of your skin. If you find that a certain food seems to aggravate your acne, you may consider avoiding that food. Discuss this with your physician! WHAT CAUSES MY ACNE? There are four contributors to acne--the body's natural oil (sebum), clogged pores, bacteria (with the scientific name Propionibacterium acnes, or P. acnes, for short), and the body's reaction to the bacteria living in the clogged pores (which causes inflammation). Here's what happens:     Sebum is produced in the normal oil-making glands in the deeper layers of the skin and reaches the surface through the skin's pores.  An increase in certain hormones occurs around the time of puberty, and these hormones trigger the oil glands to produce increased amounts of sebum. Pores with excess oil tend to become clogged more easily. At the same time, P. acnes--one of the many types of bacteria that normally live on everyone's skin--thrives in the excess oil and causes a skin reaction (inflammation). If a pore is clogged close to the surface, there is little inflammation. However, this results in the formation of “whiteheads” (closed comedones) or “blackheads” (open comedones) at the surface of the skin. A plug that extends to, or forms a little deeper in the pore, or one that enlarges or ruptures may cause more inflammation. The result is red bumps (papules) and pus-filled pimples (pustules). If plugging happens in the deepest skin layer, the inflammation may be even more severe, resulting in the formation of nodules or cysts. When these types of acne heal, they may leave behind discolored areas or true scars. SKIN HYGIENE:  HOW SHOULD I [facility] MY SKIN? Acne does not come from being dirty, however, washing your face is part of taking good care of your skin and will help keep your face clear. Good skin hygiene is, therefore, critical to support any acne treatment plan. Here are several specific suggestions for practicing good skin hygiene and keeping your skin looking its best:     You should wash acne-prone skin TWICE A DAY: Once in the morning and once in the evening. This does include any showers you take that day, so do not overdo it! Do not scrub the skin with a washcloth or loofah as these can irritate and inflame your acne. Acne does not come from “dirt”, so it is not necessary to scrub the skin clean. In fact, scrubbing may lead to dryness and irritation that makes the acne even worse and harder for patients to tolerate acne medications. Use a gentle facial moisturizing cleanser (Cetaphil Moisturizing Cleanser or Dove Fragrance-Free bar).   Avoid using soaps like [place], Dial, [place], Lever, or soft/liquid soaps as these products will dry your skin. Do not use any over-the-counter “acne washes” without your doctor's specific instruction to do so. These products often contain salicylic acid or benzoyl peroxide. These ingredients can be helpful in clearing oil from the skin and reducing bacteria, but they may also be drying and can add to irritation. Do not use exfoliating products with microbeads or brushes as these can cause irritation to the skin. Facials and other treatments to remove, squeeze, or “clean out” pores are not recommended. Manipulating the skin in this way can make acne worse and can lead to severe infections and/or scarring. It also increases the likelihood that the skin will not be able to tolerate acne medications. Try not to “pop pimples” or pick at your acne as this can delay healing and may result in scarring or skin color changes (“dark spots”) that are often more noticeable than the acne itself. Picking/popping acne can also cause a serious skin infection. Wash or change your pillow case once to twice a week, especially if you use products in your hair. Wash the skin as soon as possible after playing sports or other activities that cause a lot of sweating. Also, pay attention to how your sports equipment (shoulder pads, helmet strap, etc.) might be making your acne worse. When you use makeup, moisturizer, or sunscreen make sure that these products are labeled “non-comedogenic,” or “won't clog pores,” or “won't cause acne.”        SHOULD I TREAT MY ACNE? There are a number of other skin conditions that can look like acne.  If there is any question about the diagnosis, then the person should be evaluated by a board certified pediatric and adolescent dermatologist.  A physician should examine any child with acne who is between the ages of 3and 9years of age, as acne in this “mid-childhood” age group is not normal and may signal an underlying problem. If a “preadolescent” (9to 6years of age) or “adolescent” (15to 25years of age) has mild acne and the condition is not bothersome to the individual, proper and regular skin care (what your doctor may call “skin hygiene”) may be all that is needed at this point  Many people do, however, need specific acne medications to help their skin look and feel its best. Your doctor will tell you if you are one of these people. If so, you may be advised to use an over-the-counter or prescription medication that is applied to the skin (a “topical medication”) or if the addition of an oral medication (a medication “taken by [employer]) is needed. The good news is that the medications work well when used properly! Some specific factors that may influence the choice of acne therapy include:     Severity. The number and type of skin lesions (papules or comedones) and the degree of inflammation (mild, moderate or severe). Scarring. Scarring is most common when acne is severe, but it can happen even in children with mild acne. Impact. If a child is experiencing emotional complications because of the acne or is experiencing negative comments from other children. Cost of the acne medications. An acne expert can help to keep “out of pocket” costs to a minimum by utilizing the correct medications and the least expensive options. The patient's skin type (“oily” versus “dry” or “combination skin,” for example). Potential side effects of the medication. The ease or overall complexity of the treatment plan or medication. WHAT ACNE TREATMENTS ARE AVAILABLE? Medications for acne try to stop the formation of new pimples by reducing or removing the oil, bacteria, and other things (like dead skin cells) that clog the pores. They can also decrease the inflammation or irritation response of the skin to bacteria.  It may take from 6 to 8 weeks (about 2 months!) before you see any improvement and know if the medication is effective. It takes the layers of skin this long to regenerate. Remember, these medications do not “cure” the condition--the acne improves because of the medication. Therefore, treatment must be continued in order to prevent the return of acne lesions. There are many types of acne treatments. Some are applied to the skin (“topical” medications) and some are taken by mouth (“oral” medications). In most cases of mild acne, the doctor will start with a topical medication. There are many different topical medications that are helpful for acne. If acne is more severe and it does not respond adequately to a topical medication, or if it covers large body surface areas such as the back and/or chest, oral antibiotics such as Doxycycline or Minocycline and/or oral hormone therapy such as Oral Contraceptive Pills or Spironolactone may be prescribed. In the most severe cases, isotretinoin (Accutane) may be used. In general, it is usually best to start with acne medications that are least likely to cause side effects but are at the same time capable of addressing the specific causes for the acne. Some patients have a good result with just one medication, but many will need to use a combination of treatments: two or more different topical agents or an oral medication plus a topical medication. Another treatment used for acne may include corticosteroid injections, which are used to help relieve pain, decrease the size, and encourage the healing of large, inflamed acne nodules. Also, dermatologists sometimes perform “acne surgery,” using a fine needle, a pointed blade, or an instrument known as a comedone extractor to mechanically clean out clogged pores. One must always weigh the risk for inducing a scar with the potential benefits of any procedure. Prior treatment with topical retinoids can “loosen” whiteheads and blackheads and make it easier to physically remove such lesions.      Heat-based devices, and light and laser therapy are being studied to see whether there is any role for such treatments in mild to moderate acne. At this time, there is not enough evidence to make general recommendations about their use. TOPICAL ACNE MEDICATIONS     WHAT KIND OF TOPICALS ARE THERE? Benzoyl peroxide (BP) helps to fight inflammation and is anti-microbial (kills bacteria, viruses, and other microorganisms) and is believed to help prevent resistance of bacteria to topical antibiotics. A benzoyl peroxide “wash” may be recommended for use on large areas such as the chest and/or back. Mild irritation and dryness are common when first using benzoyl peroxide-containing products. Be careful because benzoyl peroxide can bleach towels and clothing! Retinoids (such as adapalene, tretinoin, or tazarotene) unplug the oil glands by helping peel away the layers of skin and other things plugging the opening of the glands. Mild irritation and dryness are common when first using these products. Facial waxing and other skin procedures can lead to excessive irritation and should be avoided during retinoid therapy. Antibiotics fight bacteria and help decrease inflammation. Topical antibiotics commonly used in acne include clindamycin, erythromycin, and combination agents (such as clindamycin/benzoyl peroxide or erythromycin/benzoyl peroxide). Mild irritation and dryness are common when first using these products. Typically, topical antibiotics should not be used alone as treatment for acne. Other topical agents include salicylic acid, azelaic acid, dapsone, and sulfacetamide. Mild irritation and dryness can also occur when first using these products. USING YOUR TOPICAL TREATMENTS LIKE A PRO  Apply topical medications only to clean, dry skin. Topical medications may lead to significant dryness of the affected areas. To minimize this, wait 15-20 minutes after washing before applying your topical medication.    These medications work deep in the skin to prevent new breakouts. “Spot treatment” of individual pimples does not do much. When applying topical medications to the face, use the “5-dot” method. Start by placing a small pea-sized amount of the medication on your finger. Then, place “dots” in each of five locations of your face: Mid-forehead, each cheek, nose, and chin. Next, rub the medication into the entire area of skin - not just on individual pimples! Try to avoid the delicate skin around your eyes and corners of your mouth. The medications are not magic! They take weeks if not months to work. Be patient and use your medicine on a daily basis or as directed for six weeks before asking if your skin looks better. Try not to miss more than one or two days each week when using your medications. If you are starting a new medication, then try using it “every other night” or even “every third night.” Gradually work up to [employer] a day.”  This will give your skin time to adjust.  The same medications often come in various forms or formulations: Creams, ointments, lotions, gels, microspheres, or foams. Use the formulation that has been recommended and don't switch to other forms unless instructed. Some forms (such as alcohol based gels) may be more drying and less tolerable for certain skin types. Sometimes individual medications are not as effective as a combination of two or more agents. The doctor may need to try several medications or combinations before finding the one that is best for that patient. Moisturizer, sunscreen, and make-up may be used in conjunction with topical acne medications. In general, acne medications are applied first so they may directly contact the skin. Ask your physician to review specific application instructions! It is especially important to always use sunscreen when using a topical retinoid or oral antibiotic. These drugs can make your skin more sensitive to the sun.  In general, sunscreen gets applied AFTER any acne medications. Don't stop using your acne medications just because your acne got better. Remember, the acne is better because of the medication, and prevention is the pickens to treatment. HORMONAL THERAPY  Hormonal treatment is used only in females and usually consists of oral contraceptives (birth control pills). Spironolactone is also sometimes used. HAVING PROBLEMS WITH ANY OF YOUR TREATMENTS? You should not be able to see any of the medicines on your face. If you can see a white film on your skin after you apply the medication, there is too much medicine in that area and you need to apply a thinner coat and make sure it is spread evenly on your face. If your skin gets too dry, you can apply a light (“non-comedogenic”) moisturizer on top of your medicine or you may switch to using the medicine every other day instead of every day. If your skin is still too irritated, you may need to switch to a milder medication. If your skin is red and very itchy, you may be allergic to the medication and you should stop using it. COMMON POSSIBLE SIDE EFFECTS OF MEDICATIONS     Retinoids - dryness, redness, increased sun sensitivity. Benzoyl peroxide - drying, redness, bleaching of clothes, towels and sheets, allergy. WHEN AND WHERE TO CALL WITH CONCERNS  We are here to help! If you experience any unusual symptoms, then stop taking or using the medication and call our office at [phone] (SKIN). It is better to be safe than to be sorry!

## 2023-07-27 NOTE — PROGRESS NOTES
[facility] Note     Patient Name: Maurisio Sands  Encounter Date: 07/27/2023     Have you been cared for by a [facility] in the last 3 years and, if so, which description applies to you? Yes. I have been here within the last 3 years, and my medical history has NOT changed since that time. I am FEMALE/of child-bearing potential.    REVIEW OF SYSTEMS:  Have you recently had or currently have any of the following? · No changes in my recent health. PAST MEDICAL HISTORY:  Have you personally ever had or currently have any of the following? If "YES," then please provide more detail. · No changes in my medical history. FAMILY HISTORY:  Any "first degree relatives" (parent, brother, sister, or child) with the following? • No changes in my family's known health. PATIENT EXPERIENCE:    • Do you want the Dermatologist to perform a COMPLETE skin exam today including a clinical examination under the "bra and underwear" areas? NO  • If necessary, do we have your permission to call and leave a detailed message on your Preferred Phone number that includes your specific medical information? Yes      Allergies   Allergen Reactions   • Amoxicillin Rash      Current Outpatient Medications:   •  benzonatate (TESSALON PERLES) 100 mg capsule, Take 1 capsule (100 mg total) by mouth 3 (three) times a day as needed for cough (Patient not taking: Reported on 4/27/2023), Disp: 20 capsule, Rfl: 0  •  Cetirizine HCl (ZYRTEC PO), Take by mouth, Disp: , Rfl:   •  loratadine (CLARITIN) 10 mg tablet, Take 10 mg by mouth daily (Patient not taking: Reported on 4/27/2023), Disp: , Rfl:   •  predniSONE 20 mg tablet, 1 tab twice daily for 5 days, then 1 tab daily for 5 days. (Patient not taking: Reported on 4/27/2023), Disp: 15 tablet, Rfl: 0  •  spironolactone (ALDACTONE) 25 mg tablet, Take 1 tablet (25 mg total) by mouth daily One tab PO daily. Not for use during pregnancy. , Disp: 90 tablet, Rfl: 0 • Whom besides the patient is providing clinical information about today's encounter?   o NO ADDITIONAL HISTORIAN (patient alone provided history)    Physical Exam and Assessment/Plan by Diagnosis:      1. ACNE VULGARIS ("COMMON ACNE")     Physical Exam:  • Psychiatric/Mood:  • Anatomic Location Affected: Face  • Morphological Description:  • Pertinent Positives:  • Pertinent Negatives:                 Additional History of Present Condition:  Located on face. Patient is currently using Skin Medicinals and spironolactone 25 mg tablets once tablet daily. Patient reports she has noticed some improvement with this regimen. Patient reports acne does get worse around her menstrual cycles.      Assessment and Plan:  • We reviewed the causes of acne, the “kinds” of acne, and the expected clinical course. • We discussed treatment options ranging from over-the-counter products, topical retinoids, antibiotics, BP, hormonal therapies (OCPs/spironolactone), and isotretinoin (Accutane). • We reviewed specific over-the-counter interventions and medications. Recommended typical hygiene measures including water-based facial products, washing regularly with mild cleanser, and refraining from picking and popping any pimples. • Recommended non-comedogenic sunscreen use daily. • Expectations of therapy discussed. Side effects, risks and benefits of medications discussed. • A comprehensive handout on Acne was provided. • The phone number to call in case of questions or concerns (and instructions to stop medications in such a scenario) was provided.   • After lengthy discussion of etiology and treatment options, we decided to implement the following personalized treatment plan:  • Continue skin medicinals topical medication- please let us know if you need a refill on this medication  • Please increase spironolactone to 50 mg tablets: take 1 tablet daily  • Follow up in 4-6 months      Based on a thorough discussion of this condition and the management approach to it (including a comprehensive discussion of the known risks, side effects and potential benefits of treatment), the patient (family) agrees to implement the following specific plan:        REMEMBER:  Always take your acne pills with lots of water! A pill stuck in your throat can cause significant burning and irritation. Drink a full glass of water to ensure the pill gets into your stomach. Avoid “popping” a pill right before bed, and stay upright for at least 1 hour after taking a pill.        ACNE:  WHAT ZIT ALL ABOUT?     WHY DO I HAVE ACNE/PIMPLES? Your skin is made of layers. To keep the skin from becoming dry and cracked, the skin needs oil. The oil is made in little wells in the deeper layers in the skin. People with acne have glands that make more oil and are more easily plugged, causing the glands to swell. Hormones, bacteria and your inherited tendency to have acne all play a role.     The medical term for “pimples” is acne or acne vulgaris (vulgaris means “common”). Most people get some acne. Acne does not come from being dirty. Instead, it is an expected consequence of changes that occur during normal growth and development. Hormones, bacteria, and your family's tendency to have acne may all play a role. “Whiteheads” or “blackheads” are openings of the glands (glands are the oil factories) onto the surface of the skin. “Blackheads” are not caused by dirt blocking the pores; instead, they result from the oxidation reaction of oil and skin in the pores with the air (like a “rust” reaction).         WHAT ABOUT STRESS? Stress does not “cause” acne but it can make it worse. Make sure you get enough sleep and daily exercise!      WHAT ABOUT FOODS/DIET? Try to eat a balanced, healthy diet. Some people feel that certain foods worsen their acne.  While there aren't many studies available on this question, severe dietary changes are unlikely to help your acne and may be harmful to the health of your skin. If you find that a certain food seems to aggravate your acne, you may consider avoiding that food. Discuss this with your physician!     WHAT CAUSES MY ACNE? There are four contributors to acne--the body's natural oil (sebum), clogged pores, bacteria (with the scientific name Propionibacterium acnes, or P. acnes, for short), and the body's reaction to the bacteria living in the clogged pores (which causes inflammation). Here's what happens:     • Sebum is produced in the normal oil-making glands in the deeper layers of the skin and reaches the surface through the skin's pores. An increase in certain hormones occurs around the time of puberty, and these hormones trigger the oil glands to produce increased amounts of sebum. • Pores with excess oil tend to become clogged more easily. • At the same time, P. acnes--one of the many types of bacteria that normally live on everyone's skin--thrives in the excess oil and causes a skin reaction (inflammation). • If a pore is clogged close to the surface, there is little inflammation. However, this results in the formation of “whiteheads” (closed comedones) or “blackheads” (open comedones) at the surface of the skin. • A plug that extends to, or forms a little deeper in the pore, or one that enlarges or ruptures may cause more inflammation. The result is red bumps (papules) and pus-filled pimples (pustules). • If plugging happens in the deepest skin layer, the inflammation may be even more severe, resulting in the formation of nodules or cysts. When these types of acne heal, they may leave behind discolored areas or true scars.     SKIN HYGIENE:  HOW SHOULD I [address] MY SKIN? Acne does not come from being dirty, however, washing your face is part of taking good care of your skin and will help keep your face clear. Good skin hygiene is, therefore, critical to support any acne treatment plan.   Here are several specific suggestions for practicing good skin hygiene and keeping your skin looking its best:     • You should wash acne-prone skin TWICE A DAY: Once in the morning and once in the evening. This does include any showers you take that day, so do not overdo it! • Do not scrub the skin with a washcloth or loofah as these can irritate and inflame your acne. Acne does not come from “dirt”, so it is not necessary to scrub the skin clean. In fact, scrubbing may lead to dryness and irritation that makes the acne even worse and harder for patients to tolerate acne medications. • Use a gentle facial moisturizing cleanser (Cetaphil Moisturizing Cleanser or Dove Fragrance-Free bar). Avoid using soaps like Mustang, [address], [address], or soft/liquid soaps as these products will dry your skin. • Do not use any over-the-counter “acne washes” without your doctor's specific instruction to do so. These products often contain salicylic acid or benzoyl peroxide. These ingredients can be helpful in clearing oil from the skin and reducing bacteria, but they may also be drying and can add to irritation. • Do not use exfoliating products with microbeads or brushes as these can cause irritation to the skin. • Facials and other treatments to remove, squeeze, or “clean out” pores are not recommended. Manipulating the skin in this way can make acne worse and can lead to severe infections and/or scarring. It also increases the likelihood that the skin will not be able to tolerate acne medications. • Try not to “pop pimples” or pick at your acne as this can delay healing and may result in scarring or skin color changes (“dark spots”) that are often more noticeable than the acne itself. Picking/popping acne can also cause a serious skin infection. • Wash or change your pillow case once to twice a week, especially if you use products in your hair.    • Wash the skin as soon as possible after playing sports or other activities that cause a lot of sweating. Also, pay attention to how your sports equipment (shoulder pads, helmet strap, etc.) might be making your acne worse. • When you use makeup, moisturizer, or sunscreen make sure that these products are labeled “non-comedogenic,” or “won't clog pores,” or “won't cause acne.”        SHOULD I TREAT MY ACNE?     There are a number of other skin conditions that can look like acne. If there is any question about the diagnosis, then the person should be evaluated by a board certified pediatric and adolescent dermatologist.  A physician should examine any child with acne who is between the ages of 3and 9years of age, as acne in this “mid-childhood” age group is not normal and may signal an underlying problem. If a “preadolescent” (9to 6years of age) or “adolescent” (15to 25years of age) has mild acne and the condition is not bothersome to the individual, proper and regular skin care (what your doctor may call “skin hygiene”) may be all that is needed at this point  Many people do, however, need specific acne medications to help their skin look and feel its best. Your doctor will tell you if you are one of these people. If so, you may be advised to use an over-the-counter or prescription medication that is applied to the skin (a “topical medication”) or if the addition of an oral medication (a medication “taken by [facility]) is needed. The good news is that the medications work well when used properly! Some specific factors that may influence the choice of acne therapy include:     • Severity. The number and type of skin lesions (papules or comedones) and the degree of inflammation (mild, moderate or severe). • Scarring. Scarring is most common when acne is severe, but it can happen even in children with mild acne. • Impact. If a child is experiencing emotional complications because of the acne or is experiencing negative comments from other children. • Cost of the acne medications.   An acne expert can help to keep “out of pocket” costs to a minimum by utilizing the correct medications and the least expensive options. • The patient's skin type (“oily” versus “dry” or “combination skin,” for example). • Potential side effects of the medication. • The ease or overall complexity of the treatment plan or medication.     WHAT ACNE TREATMENTS ARE AVAILABLE?     Medications for acne try to stop the formation of new pimples by reducing or removing the oil, bacteria, and other things (like dead skin cells) that clog the pores. They can also decrease the inflammation or irritation response of the skin to bacteria. It may take from 6 to 8 weeks (about 2 months!) before you see any improvement and know if the medication is effective. It takes the layers of skin this long to regenerate. Remember, these medications do not “cure” the condition--the acne improves because of the medication. Therefore, treatment must be continued in order to prevent the return of acne lesions. There are many types of acne treatments. Some are applied to the skin (“topical” medications) and some are taken by mouth (“oral” medications). In most cases of mild acne, the doctor will start with a topical medication. There are many different topical medications that are helpful for acne. If acne is more severe and it does not respond adequately to a topical medication, or if it covers large body surface areas such as the back and/or chest, oral antibiotics such as Doxycycline or Minocycline and/or oral hormone therapy such as Oral Contraceptive Pills or Spironolactone may be prescribed. In the most severe cases, isotretinoin (Accutane) may be used. In general, it is usually best to start with acne medications that are least likely to cause side effects but are at the same time capable of addressing the specific causes for the acne.  Some patients have a good result with just one medication, but many will need to use a combination of treatments: two or more different topical agents or an oral medication plus a topical medication. Another treatment used for acne may include corticosteroid injections, which are used to help relieve pain, decrease the size, and encourage the healing of large, inflamed acne nodules. Also, dermatologists sometimes perform “acne surgery,” using a fine needle, a pointed blade, or an instrument known as a comedone extractor to mechanically clean out clogged pores. One must always weigh the risk for inducing a scar with the potential benefits of any procedure. Prior treatment with topical retinoids can “loosen” whiteheads and blackheads and make it easier to physically remove such lesions. Heat-based devices, and light and laser therapy are being studied to see whether there is any role for such treatments in mild to moderate acne. At this time, there is not enough evidence to make general recommendations about their use.     TOPICAL ACNE MEDICATIONS     WHAT KIND OF TOPICALS ARE THERE? • Benzoyl peroxide (BP) helps to fight inflammation and is anti-microbial (kills bacteria, viruses, and other microorganisms) and is believed to help prevent resistance of bacteria to topical antibiotics. A benzoyl peroxide “wash” may be recommended for use on large areas such as the chest and/or back. Mild irritation and dryness are common when first using benzoyl peroxide-containing products. Be careful because benzoyl peroxide can bleach towels and clothing! • Retinoids (such as adapalene, tretinoin, or tazarotene) unplug the oil glands by helping peel away the layers of skin and other things plugging the opening of the glands. Mild irritation and dryness are common when first using these products. Facial waxing and other skin procedures can lead to excessive irritation and should be avoided during retinoid therapy. • Antibiotics fight bacteria and help decrease inflammation.  Topical antibiotics commonly used in acne include clindamycin, erythromycin, and combination agents (such as clindamycin/benzoyl peroxide or erythromycin/benzoyl peroxide). Mild irritation and dryness are common when first using these products. Typically, topical antibiotics should not be used alone as treatment for acne. • Other topical agents include salicylic acid, azelaic acid, dapsone, and sulfacetamide. Mild irritation and dryness can also occur when first using these products.     USING YOUR TOPICAL TREATMENTS LIKE A PRO  • Apply topical medications only to clean, dry skin. Topical medications may lead to significant dryness of the affected areas. To minimize this, wait 15-20 minutes after washing before applying your topical medication. • These medications work deep in the skin to prevent new breakouts. “Spot treatment” of individual pimples does not do much. When applying topical medications to the face, use the “5-dot” method. Start by placing a small pea-sized amount of the medication on your finger. Then, place “dots” in each of five locations of your face: Mid-forehead, each cheek, nose, and chin. Next, rub the medication into the entire area of skin - not just on individual pimples! Try to avoid the delicate skin around your eyes and corners of your mouth. • The medications are not magic! They take weeks if not months to work. Be patient and use your medicine on a daily basis or as directed for six weeks before asking if your skin looks better. Try not to miss more than one or two days each week when using your medications. • If you are starting a new medication, then try using it “every other night” or even “every third night.” Gradually work up to [facility] a day.”  This will give your skin time to adjust.  • The same medications often come in various forms or formulations: Creams, ointments, lotions, gels, microspheres, or foams. Use the formulation that has been recommended and don't switch to other forms unless instructed.  Some forms (such as alcohol based gels) may be more drying and less tolerable for certain skin types. • Sometimes individual medications are not as effective as a combination of two or more agents. The doctor may need to try several medications or combinations before finding the one that is best for that patient. • Moisturizer, sunscreen, and make-up may be used in conjunction with topical acne medications. In general, acne medications are applied first so they may directly contact the skin. Ask your physician to review specific application instructions! • It is especially important to always use sunscreen when using a topical retinoid or oral antibiotic. These drugs can make your skin more sensitive to the sun. In general, sunscreen gets applied AFTER any acne medications. • Don't stop using your acne medications just because your acne got better. Remember, the acne is better because of the medication, and prevention is the pickens to treatment. HORMONAL THERAPY  Hormonal treatment is used only in females and usually consists of oral contraceptives (birth control pills). Spironolactone is also sometimes used.        HAVING PROBLEMS WITH ANY OF YOUR TREATMENTS?     You should not be able to see any of the medicines on your face. If you can see a white film on your skin after you apply the medication, there is too much medicine in that area and you need to apply a thinner coat and make sure it is spread evenly on your face.       If your skin gets too dry, you can apply a light (“non-comedogenic”) moisturizer on top of your medicine or you may switch to using the medicine every other day instead of every day. If your skin is still too irritated, you may need to switch to a milder medication.       If your skin is red and very itchy, you may be allergic to the medication and you should stop using it.        COMMON POSSIBLE SIDE EFFECTS OF MEDICATIONS     • Retinoids - dryness, redness, increased sun sensitivity.   • Benzoyl peroxide - drying, redness, bleaching of clothes, towels and sheets, allergy.     WHEN AND WHERE TO CALL WITH CONCERNS  We are here to help! If you experience any unusual symptoms, then stop taking or using the medication and call our office at [phone] (SKIN). It is better to be safe than to be sorry!           Scribe Attestation    I,:  Melissa Peraza am acting as a scribe while in the presence of the attending physician.:       I,:  Bella Shrestha MD personally performed the services described in this documentation    as scribed in my presence.:

## 2023-10-21 DIAGNOSIS — L70.0 ACNE VULGARIS: ICD-10-CM

## 2023-10-23 RX ORDER — SPIRONOLACTONE 50 MG/1
TABLET, FILM COATED ORAL
Qty: 90 TABLET | Refills: 0 | Status: SHIPPED | OUTPATIENT
Start: 2023-10-23

## 2023-10-27 ENCOUNTER — OFFICE VISIT (OUTPATIENT)
Dept: URGENT CARE | Facility: CLINIC | Age: 38
End: 2023-10-27
Payer: COMMERCIAL

## 2023-10-27 VITALS
TEMPERATURE: 97 F | HEART RATE: 68 BPM | RESPIRATION RATE: 18 BRPM | OXYGEN SATURATION: 100 % | DIASTOLIC BLOOD PRESSURE: 91 MMHG | SYSTOLIC BLOOD PRESSURE: 150 MMHG

## 2023-10-27 DIAGNOSIS — J06.9 UPPER RESPIRATORY TRACT INFECTION, UNSPECIFIED TYPE: Primary | ICD-10-CM

## 2023-10-27 PROCEDURE — 99213 OFFICE O/P EST LOW 20 MIN: CPT | Performed by: FAMILY MEDICINE

## 2023-10-27 RX ORDER — METHYLPREDNISOLONE 4 MG/1
TABLET ORAL
Qty: 21 TABLET | Refills: 0 | Status: SHIPPED | OUTPATIENT
Start: 2023-10-27

## 2023-10-27 RX ORDER — BENZONATATE 200 MG/1
200 CAPSULE ORAL 3 TIMES DAILY PRN
Qty: 30 CAPSULE | Refills: 0 | Status: SHIPPED | OUTPATIENT
Start: 2023-10-27

## 2023-10-27 NOTE — PROGRESS NOTES
[facility]        NAME: Rosa Moss is a 45 y.o. female  : 1985    MRN: [id number]  DATE: 2023  TIME: 9:02 AM    Assessment and Plan   Upper respiratory tract infection, unspecified type [J06.9]  1. Upper respiratory tract infection, unspecified type  benzonatate (TESSALON) 200 MG capsule    methylPREDNISolone 4 MG tablet therapy pack            Patient Instructions       Follow up with PCP in 3-5 days. Proceed to  ER if symptoms worsen. Chief Complaint     Chief Complaint   Patient presents with    Cough     Patient states she started in the upper chest area into the throat and a cough since Wednesday. States she was around a sick student that had pneumonia and bronchitis. Denies any other symptoms at this time. History of Present Illness       59-year-old female with 2-day history of cough and wheezing. She also reports feeling short of breath when she begins to cough. Denies any fevers or chills. Review of Systems   Review of Systems   Constitutional: Negative. HENT: Negative. Eyes: Negative. Respiratory:  Positive for cough and wheezing. Cardiovascular: Negative. Gastrointestinal: Negative. Genitourinary: Negative. Musculoskeletal:  Positive for arthralgias and myalgias. Skin: Negative. Allergic/Immunologic: Negative. Neurological: Negative. Hematological: Negative. Psychiatric/Behavioral: Negative.            Current Medications       Current Outpatient Medications:     benzonatate (TESSALON) 200 MG capsule, Take 1 capsule (200 mg total) by mouth 3 (three) times a day as needed for cough, Disp: 30 capsule, Rfl: 0    methylPREDNISolone 4 MG tablet therapy pack, Use as directed on package, Disp: 21 tablet, Rfl: 0    benzonatate (TESSALON PERLES) 100 mg capsule, Take 1 capsule (100 mg total) by mouth 3 (three) times a day as needed for cough (Patient not taking: Reported on 2023), Disp: 20 capsule, Rfl: 0    Cetirizine HCl (ZYRTEC PO), Take by mouth (Patient not taking: Reported on 7/27/2023), Disp: , Rfl:     loratadine (CLARITIN) 10 mg tablet, Take 10 mg by mouth daily (Patient not taking: Reported on 4/27/2023), Disp: , Rfl:     predniSONE 20 mg tablet, 1 tab twice daily for 5 days, then 1 tab daily for 5 days. (Patient not taking: Reported on 4/27/2023), Disp: 15 tablet, Rfl: 0    spironolactone (ALDACTONE) 50 mg tablet, TAKE 1 TABLET (50 MG TOTAL) BY MOUTH DAILY. NOT FOR USE DURING PREGNANCY. AVOID EXCESSIVE DIETARY POTASSIUM., Disp: 90 tablet, Rfl: 0    Current Allergies     Allergies as of 10/27/2023 - Reviewed 10/27/2023   Allergen Reaction Noted    Amoxicillin Rash 09/27/2022            The following portions of the patient's history were reviewed and updated as appropriate: allergies, current medications, past family history, past medical history, past social history, past surgical history and problem list.     Past Medical History:   Diagnosis Date    Allergic 2000    Headache(784.0) 2000       No past surgical history on file. Family History   Problem Relation Age of Onset    No Known Problems Mother     No Known Problems Father     Heart disease Maternal Grandfather     Diabetes Paternal Grandmother     Cancer Paternal Grandmother          Medications have been verified. Objective   /91   Pulse 68   Temp (!) 97 °F (36.1 °C) (Tympanic)   Resp 18   SpO2 100%   No LMP recorded. Physical Exam     Physical Exam  Vitals and nursing note reviewed. Constitutional:       Appearance: She is well-developed. HENT:      Head: Normocephalic. Nose: Nose normal.   Eyes:      Pupils: Pupils are equal, round, and reactive to light. Cardiovascular:      Rate and Rhythm: Normal rate. Pulmonary:      Effort: Pulmonary effort is normal.      Breath sounds: Wheezing present. Abdominal:      General: Abdomen is flat. Musculoskeletal:         General: Normal range of motion.       Cervical back: Normal range of motion. Skin:     General: Skin is warm and dry. Neurological:      Mental Status: She is alert and oriented to person, place, and time.

## 2023-11-13 ENCOUNTER — OFFICE VISIT (OUTPATIENT)
Age: 38
End: 2023-11-13
Payer: COMMERCIAL

## 2023-11-13 VITALS
BODY MASS INDEX: 24.49 KG/M2 | HEART RATE: 75 BPM | HEIGHT: 65 IN | WEIGHT: 147 LBS | SYSTOLIC BLOOD PRESSURE: 138 MMHG | DIASTOLIC BLOOD PRESSURE: 70 MMHG | OXYGEN SATURATION: 97 %

## 2023-11-13 DIAGNOSIS — M99.02 SEGMENTAL DYSFUNCTION OF THORACIC REGION: ICD-10-CM

## 2023-11-13 DIAGNOSIS — M99.03 SEGMENTAL DYSFUNCTION OF LUMBAR REGION: Primary | ICD-10-CM

## 2023-11-13 DIAGNOSIS — M99.04 SEGMENTAL DYSFUNCTION OF SACRAL REGION: ICD-10-CM

## 2023-11-13 DIAGNOSIS — M54.59 MECHANICAL LOW BACK PAIN: ICD-10-CM

## 2023-11-13 PROCEDURE — 97110 THERAPEUTIC EXERCISES: CPT | Performed by: CHIROPRACTOR

## 2023-11-13 PROCEDURE — 99203 OFFICE O/P NEW LOW 30 MIN: CPT | Performed by: CHIROPRACTOR

## 2023-11-13 PROCEDURE — 98941 CHIROPRACT MANJ 3-4 REGIONS: CPT | Performed by: CHIROPRACTOR

## 2023-11-13 NOTE — PROGRESS NOTES
Diagnoses and all orders for this visit:    Segmental dysfunction of lumbar region    Segmental dysfunction of sacral region    Mechanical low back pain    Segmental dysfunction of thoracic region       ASSESSMENT:  No red flags, radiculopathy or neurologic deficit appreciated clinically. Pt's symptoms and exam findings consistent with mechanical lbp secondary to repetitive st/sp injury, exacerbated by postural/ergonomic stressors. Pt responded well to flexion biased stretches and manual mobilization of the affected spinal and myofascial tissues with increased ROM; trial of conservative tx recommended consisting of stretching, graded mobilization/manipulation of the affected spinal and myofascial jt dysfunction, postural/ergonomic education and take home stretches/exercises. If symptoms fail to improve with short trial of conservative care, appropriate imaging and referral will be coordinated. Spent greater than 30 min c pt discussing hx, pe, ddx, tx options and reviewing notes/imaging    PROCEDURE CODES: 30323 and 81329, [id number]    TREATMENT:  Fear avoidance behavior discussion; encouraged and reassured pt that natural course of condition is to improve over time with adherence to tx plan and home care strategies. Home care recommendations: avoid bed rest, walk (but avoid trails and uneven surfaces), gradual return to activity to tolerance (avoid anything that peripheralizes symptoms), call if symptoms peripheralize, worsen, or neurologic deficit progresses. Ther-ex: IASTM; discussed post procedure soreness and/or ecchymosis for up to 36 hrs, applied to affected mm hypertonicities; supine hamstring stretch, supine gluteal stretch, side laying QL stretch, single knee to chest stretch, hip flexor pin-and-stretch, alternating prone hip extension, glute bridge, transitional mvmt education, abdominal bracing; greater than 15 min spent performing above mentioned ther-ex to improve ROM/flexibility.  Thoracic mobilization/manipulation: prone P-A mob, supine A-P manip; Lumbar mobilization/manipulation: diversified side laying graded HVLA, flexion-traction; SIJ Manipulation/Mobilization: R/L SIJ HVLA - long axis distraction, hill drop table maneuver to affected SIJ    HPI:  Lorri Morales is a 45 y.o. female  Chief Complaint   Patient presents with   • Back Pain     Lower back pain-9     The patient is present with the office with lower back pain , that patient reports this is a chronic problem but had a flare up after helping a friend with a project that was lower to the ground. The onset was two weeks ago. The patient was taking 1 tablet of OTC Aleve. Hot showers, heating bad. Since onset pain is slightly lessened. 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 Back Pain  This is a chronic problem. The pain is present in the lumbar spine, sacro-iliac and thoracic spine. Pertinent negatives include no chest pain, fever, headaches, numbness or weakness. Past Medical History:   Diagnosis Date   • Allergic 2000   • Headache(784.0) 2000      History reviewed. No pertinent surgical history. The following portions of the patient's history were reviewed and updated as appropriate: allergies, past family history, past medical history, past social history, past surgical history, and problem list.  Review of Systems   Constitutional:  Negative for activity change, fatigue, fever and unexpected weight change. HENT:  Negative for ear pain, hearing loss, sinus pressure, sinus pain, sore throat and tinnitus. Respiratory:  Negative for chest tightness, shortness of breath, wheezing and stridor. Cardiovascular:  Negative for chest pain. Genitourinary:  Negative for flank pain and frequency. Musculoskeletal:  Positive for back pain and myalgias. Negative for joint swelling, neck pain and neck stiffness. Skin:  Negative for color change and pallor.    Neurological:  Negative for dizziness, speech difficulty, weakness, numbness and headaches. Psychiatric/Behavioral:  Negative for agitation and sleep disturbance. The patient is not nervous/anxious. Physical Exam  Constitutional:       General: She is not in acute distress. Appearance: Normal appearance. HENT:      Head: Normocephalic. Mouth/Throat:      Mouth: Mucous membranes are moist.   Eyes:      Extraocular Movements: Extraocular movements intact. Conjunctiva/sclera: Conjunctivae normal.      Pupils: Pupils are equal, round, and reactive to light. Neck:      Vascular: No carotid bruit. Pulmonary:      Effort: Pulmonary effort is normal.   Chest:      Chest wall: No tenderness. Abdominal:      General: Abdomen is flat. Palpations: Abdomen is soft. Musculoskeletal:         General: No swelling, deformity or signs of injury. Cervical back: Normal range of motion. No rigidity or tenderness. Thoracic back: Spasms and tenderness present. No swelling, edema, deformity, signs of trauma or lacerations. Decreased range of motion. Lumbar back: Spasms and tenderness present. No swelling, edema, deformity, signs of trauma, lacerations or bony tenderness. Decreased range of motion. Positive right straight leg raise test. No scoliosis. Back:       Right lower leg: No edema. Left lower leg: No edema. Lymphadenopathy:      Cervical: No cervical adenopathy. Skin:     General: Skin is warm. Coloration: Skin is not jaundiced or pale. Findings: No bruising or erythema. Neurological:      Mental Status: She is alert and oriented to person, place, and time. Cranial Nerves: No cranial nerve deficit. Sensory: No sensory deficit. Motor: No weakness. Gait: Gait is intact. Deep Tendon Reflexes: Reflexes are normal and symmetric.    Psychiatric:         Attention and Perception: Attention normal.         Mood and Affect: Mood and affect normal.         Speech: Speech normal. Behavior: Behavior normal. Behavior is cooperative. Thought Content: Thought content normal.         Cognition and Memory: Cognition normal.         Judgment: Judgment normal.       SOFT TISSUE ASSESSMENT Hypertonicity and tenderness palpated B T10-S1 erector spinae, hip flexor, glute med/min, QL, hamstring JOINT RESTRICTIONS: T10-S1 and R SIJ ORTHO: SI jt point tenderness: +; Regina unremarkable for centralization/peripheralization; erwin's, iliac compression, thigh thrust elicit lbp in R/L SIJ; prone femoral nerve stretch neg for upper lumbar neural tension, elicits R/L SIJ stiffness; sitting root elicits no lbp on R/L; slump test elicits no neural tension R/L    Return in about 1 week (around 11/20/2023) for Recheck.

## 2023-11-27 ENCOUNTER — PROCEDURE VISIT (OUTPATIENT)
Age: 38
End: 2023-11-27
Payer: COMMERCIAL

## 2023-11-27 VITALS
WEIGHT: 147 LBS | HEART RATE: 63 BPM | OXYGEN SATURATION: 97 % | DIASTOLIC BLOOD PRESSURE: 78 MMHG | BODY MASS INDEX: 24.49 KG/M2 | HEIGHT: 65 IN | SYSTOLIC BLOOD PRESSURE: 122 MMHG

## 2023-11-27 DIAGNOSIS — M99.03 SEGMENTAL DYSFUNCTION OF LUMBAR REGION: Primary | ICD-10-CM

## 2023-11-27 DIAGNOSIS — M99.02 SEGMENTAL DYSFUNCTION OF THORACIC REGION: ICD-10-CM

## 2023-11-27 DIAGNOSIS — M54.59 MECHANICAL LOW BACK PAIN: ICD-10-CM

## 2023-11-27 DIAGNOSIS — M99.04 SEGMENTAL DYSFUNCTION OF SACRAL REGION: ICD-10-CM

## 2023-11-27 PROCEDURE — 97110 THERAPEUTIC EXERCISES: CPT | Performed by: CHIROPRACTOR

## 2023-11-27 PROCEDURE — 98941 CHIROPRACT MANJ 3-4 REGIONS: CPT | Performed by: CHIROPRACTOR

## 2023-11-27 NOTE — PROGRESS NOTES
Diagnoses and all orders for this visit:    Segmental dysfunction of lumbar region    Segmental dysfunction of sacral region    Mechanical low back pain    Segmental dysfunction of thoracic region       ASSESSMENT:  . Pt's symptoms and exam findings consistent with mechanical lbp secondary to repetitive st/sp injury, exacerbated by postural/ergonomic stressors. Pt responded well to flexion biased stretches and manual mobilization of the affected spinal and myofascial tissues with increased ROM; trial of conservative tx recommended consisting of stretching, graded mobilization/manipulation of the affected spinal and myofascial jt dysfunction, postural/ergonomic education and take home stretches/exercises. If symptoms fail to improve with short trial of conservative care, appropriate imaging and referral will be coordinated. - The pt is feeling better overall. Improvements with mm spasm in the lower back and less pain with transitioning to standing position. Advised to start stationary bike for 5-10 minutes to see if well tolerated. F/U 2 wks    PROCEDURE CODES: 63329 and 04298  TREATMENT:  Fear avoidance behavior discussion; encouraged and reassured pt that natural course of condition is to improve over time with adherence to tx plan and home care strategies. Home care recommendations: avoid bed rest, walk (but avoid trails and uneven surfaces), gradual return to activity to tolerance (avoid anything that peripheralizes symptoms), call if symptoms peripheralize, worsen, or neurologic deficit progresses.  Ther-ex: IASTM; discussed post procedure soreness and/or ecchymosis for up to 36 hrs, applied to affected mm hypertonicities; supine hamstring stretch, supine gluteal stretch, side laying QL stretch, single knee to chest stretch, hip flexor pin-and-stretch, alternating prone hip extension, glute bridge, transitional mvmt education, abdominal bracing; greater than 15 min spent performing above mentioned ther-ex to improve ROM/flexibility. Thoracic mobilization/manipulation: prone P-A mob, supine A-P manip; Lumbar mobilization/manipulation: diversified side laying graded HVLA, flexion-traction; SIJ Manipulation/Mobilization: R/L SIJ HVLA - long axis distraction, hill drop table maneuver to affected SIJ    HPI:  Genevieve Kerr is a 45 y.o. female  Chief Complaint   Patient presents with   • Back Pain     Lower back pain-2     The patient is present with the office with lower back pain , that patient reports this is a chronic problem but had a flare up after helping a friend with a project that was lower to the ground. The onset was two weeks ago. The patient was taking 1 tablet of OTC Aleve. Hot showers, heating bad. Since onset pain is slightly lessened. 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  11/27- The patient reports she has improved with pain but is avoiding stationary bike. Back Pain  This is a chronic problem. The pain is present in the lumbar spine, sacro-iliac and thoracic spine. Past Medical History:   Diagnosis Date   • Allergic 2000   • Headache(784.0) 2000      History reviewed. No pertinent surgical history. The following portions of the patient's history were reviewed and updated as appropriate: allergies, past family history, past medical history, past social history, past surgical history, and problem list.  Review of Systems   Musculoskeletal:  Positive for back pain and myalgias. Physical Exam  Musculoskeletal:      Thoracic back: Spasms and tenderness present. No swelling, edema, deformity, signs of trauma or lacerations. Decreased range of motion. Lumbar back: Spasms and tenderness present. No swelling, edema, deformity, signs of trauma, lacerations or bony tenderness. Decreased range of motion. Positive right straight leg raise test. No scoliosis. Back:    Neurological:      Gait: Gait is intact.       Deep Tendon Reflexes: Reflexes are normal and symmetric. SOFT TISSUE ASSESSMENT Hypertonicity and tenderness palpated B T10-S1 erector spinae, hip flexor, glute med/min, QL, hamstring JOINT RESTRICTIONS: T10-S1 and R SIJ ORTHO: SI jt point tenderness: +; Regina unremarkable for centralization/peripheralization; erwin's, iliac compression, thigh thrust elicit lbp in R/L SIJ; prone femoral nerve stretch neg for upper lumbar neural tension, elicits R/L SIJ stiffness; sitting root elicits no lbp on R/L; slump test elicits no neural tension R/L    Return in about 2 weeks (around 12/11/2023) for Recheck.

## 2023-12-11 ENCOUNTER — PROCEDURE VISIT (OUTPATIENT)
Age: 38
End: 2023-12-11
Payer: COMMERCIAL

## 2023-12-11 VITALS
SYSTOLIC BLOOD PRESSURE: 122 MMHG | BODY MASS INDEX: 24.49 KG/M2 | OXYGEN SATURATION: 98 % | DIASTOLIC BLOOD PRESSURE: 70 MMHG | HEIGHT: 65 IN | HEART RATE: 85 BPM | WEIGHT: 147 LBS

## 2023-12-11 DIAGNOSIS — M99.02 SEGMENTAL DYSFUNCTION OF THORACIC REGION: ICD-10-CM

## 2023-12-11 DIAGNOSIS — M99.04 SEGMENTAL DYSFUNCTION OF SACRAL REGION: ICD-10-CM

## 2023-12-11 DIAGNOSIS — M54.59 MECHANICAL LOW BACK PAIN: ICD-10-CM

## 2023-12-11 DIAGNOSIS — M99.03 SEGMENTAL DYSFUNCTION OF LUMBAR REGION: Primary | ICD-10-CM

## 2023-12-11 PROCEDURE — 97110 THERAPEUTIC EXERCISES: CPT | Performed by: CHIROPRACTOR

## 2023-12-11 PROCEDURE — 98941 CHIROPRACT MANJ 3-4 REGIONS: CPT | Performed by: CHIROPRACTOR

## 2023-12-11 NOTE — PROGRESS NOTES
Diagnoses and all orders for this visit:    Segmental dysfunction of lumbar region    Segmental dysfunction of sacral region    Mechanical low back pain    Segmental dysfunction of thoracic region       ASSESSMENT:  Pt's symptoms and exam findings consistent with mechanical lbp secondary to repetitive st/sp injury, exacerbated by postural/ergonomic stressors. Pt responded well to flexion biased stretches and manual mobilization of the affected spinal and myofascial tissues with increased ROM; trial of conservative tx recommended consisting of stretching, graded mobilization/manipulation of the affected spinal and myofascial jt dysfunction, postural/ergonomic education and take home stretches/exercises. If symptoms fail to improve with short trial of conservative care, appropriate imaging and referral will be coordinated. - The pt is feeling better overall. Improvements with mm spasm in the lower back and less pain with transitioning to standing position. Advised to start stationary bike for 5-10 minutes to see if well tolerated. F/U 2 wks  -The patient tolerated treatment fairly well    PROCEDURE CODES: 40778 and 82117  TREATMENT:  Fear avoidance behavior discussion; encouraged and reassured pt that natural course of condition is to improve over time with adherence to tx plan and home care strategies. Home care recommendations: avoid bed rest, walk (but avoid trails and uneven surfaces), gradual return to activity to tolerance (avoid anything that peripheralizes symptoms), call if symptoms peripheralize, worsen, or neurologic deficit progresses.  Ther-ex: IASTM; discussed post procedure soreness and/or ecchymosis for up to 36 hrs, applied to affected mm hypertonicities; supine hamstring stretch, supine gluteal stretch, side laying QL stretch, single knee to chest stretch, hip flexor pin-and-stretch, alternating prone hip extension, glute bridge, transitional mvmt education, abdominal bracing; greater than 15 min spent performing above mentioned ther-ex to improve ROM/flexibility. Thoracic mobilization/manipulation: prone P-A mob, supine A-P manip; Lumbar mobilization/manipulation: diversified side laying graded HVLA, flexion-traction; SIJ Manipulation/Mobilization: R/L SIJ HVLA - long axis distraction, hill drop table maneuver to affected SIJ    HPI:  Babetta Bumpers is a 45 y.o. female  Chief Complaint   Patient presents with   • Back Pain     Lower back pain-1     The patient is present with the office with lower back pain , that patient reports this is a chronic problem but had a flare up after helping a friend with a project that was lower to the ground. The onset was two weeks ago. The patient was taking 1 tablet of OTC Aleve. Hot showers, heating bad. Since onset pain is slightly lessened. 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  11/27- The patient reports she has improved with pain but is avoiding stationary bike. 12/11- The reports reportts she is able to bike on stationary bike for about 10 mins before she needs a stretching break but can bike total 30 mins. Back Pain  This is a chronic problem. The pain is present in the lumbar spine, sacro-iliac and thoracic spine. Past Medical History:   Diagnosis Date   • Allergic 2000   • Headache(784.0) 2000      History reviewed. No pertinent surgical history. The following portions of the patient's history were reviewed and updated as appropriate: allergies, past family history, past medical history, past social history, past surgical history, and problem list.  Review of Systems   Musculoskeletal:  Positive for back pain and myalgias. Physical Exam  Musculoskeletal:      Thoracic back: Spasms and tenderness present. No swelling, edema, deformity, signs of trauma or lacerations. Decreased range of motion. Lumbar back: Spasms and tenderness present.  No swelling, edema, deformity, signs of trauma, lacerations or bony tenderness. Decreased range of motion. Positive right straight leg raise test. No scoliosis. Back:    Neurological:      Gait: Gait is intact. Deep Tendon Reflexes: Reflexes are normal and symmetric. SOFT TISSUE ASSESSMENT Hypertonicity and tenderness palpated B T10-S1 erector spinae, hip flexor, glute med/min, QL, hamstring JOINT RESTRICTIONS: T10-S1 and R SIJ ORTHO: SI jt point tenderness: +; Regina unremarkable for centralization/peripheralization; erwin's, iliac compression, thigh thrust elicit lbp in R/L SIJ; prone femoral nerve stretch neg for upper lumbar neural tension, elicits R/L SIJ stiffness; sitting root elicits no lbp on R/L; slump test elicits no neural tension R/L    Return in about 1 week (around 12/18/2023) for Recheck.

## 2024-01-05 ENCOUNTER — PROCEDURE VISIT (OUTPATIENT)
Age: 39
End: 2024-01-05
Payer: COMMERCIAL

## 2024-01-05 VITALS
OXYGEN SATURATION: 98 % | HEIGHT: 65 IN | DIASTOLIC BLOOD PRESSURE: 78 MMHG | SYSTOLIC BLOOD PRESSURE: 126 MMHG | BODY MASS INDEX: 24.49 KG/M2 | HEART RATE: 87 BPM | WEIGHT: 147 LBS

## 2024-01-05 DIAGNOSIS — M99.04 SEGMENTAL DYSFUNCTION OF SACRAL REGION: ICD-10-CM

## 2024-01-05 DIAGNOSIS — M99.02 SEGMENTAL DYSFUNCTION OF THORACIC REGION: ICD-10-CM

## 2024-01-05 DIAGNOSIS — M54.59 MECHANICAL LOW BACK PAIN: ICD-10-CM

## 2024-01-05 DIAGNOSIS — M99.03 SEGMENTAL DYSFUNCTION OF LUMBAR REGION: Primary | ICD-10-CM

## 2024-01-05 PROCEDURE — 97110 THERAPEUTIC EXERCISES: CPT | Performed by: CHIROPRACTOR

## 2024-01-05 PROCEDURE — 98941 CHIROPRACT MANJ 3-4 REGIONS: CPT | Performed by: CHIROPRACTOR

## 2024-01-05 NOTE — PROGRESS NOTES
Diagnoses and all orders for this visit:    Segmental dysfunction of lumbar region    Segmental dysfunction of sacral region    Mechanical low back pain    Segmental dysfunction of thoracic region       ASSESSMENT:  Pt's symptoms and exam findings consistent with mechanical lbp secondary to repetitive st/sp injury, exacerbated by postural/ergonomic stressors. Pt responded well to flexion biased stretches and manual mobilization of the affected spinal and myofascial tissues with increased ROM; trial of conservative tx recommended consisting of stretching, graded mobilization/manipulation of the affected spinal and myofascial jt dysfunction, postural/ergonomic education and take home stretches/exercises. If symptoms fail to improve with short trial of conservative care, appropriate imaging and referral will be coordinated.  - The pt is feeling better overall. Improvements with mm spasm in the lower back and less pain with transitioning to standing position. Advised to start stationary bike for 5-10 minutes to see if well tolerated. F/U 2 wks  -The patient report good progress, F/U 1 month    PROCEDURE CODES: 57366 and 28195  TREATMENT:  Fear avoidance behavior discussion; encouraged and reassured pt that natural course of condition is to improve over time with adherence to tx plan and home care strategies. Home care recommendations: avoid bed rest, walk (but avoid trails and uneven surfaces), gradual return to activity to tolerance (avoid anything that peripheralizes symptoms), call if symptoms peripheralize, worsen, or neurologic deficit progresses. Ther-ex: IASTM; discussed post procedure soreness and/or ecchymosis for up to 36 hrs, applied to affected mm hypertonicities; supine hamstring stretch, supine gluteal stretch, side laying QL stretch, single knee to chest stretch, hip flexor pin-and-stretch, alternating prone hip extension, glute bridge, transitional mvmt education, abdominal bracing; greater than 15 min  spent performing above mentioned ther-ex to improve ROM/flexibility. Thoracic mobilization/manipulation: prone P-A mob, supine A-P manip; Lumbar mobilization/manipulation: diversified side laying graded HVLA, flexion-traction; SIJ Manipulation/Mobilization: R/L SIJ HVLA - long axis distraction, hill drop table maneuver to affected SIJ    HPI:  Carmita Roque is a 38 y.o. female  Chief Complaint   Patient presents with   • Back Pain     Lower back pain-0     The patient is present with the office with lower back pain , that patient reports this is a chronic problem but had a flare up after helping a friend with a project that was lower to the ground. The onset was two weeks ago. The patient was taking 1 tablet of OTC Aleve. Hot showers, heating bad. Since onset pain is slightly lessened. 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  11/27- The patient reports she has improved with pain but is avoiding stationary bike.   12/11- The reports reportts she is able to bike on stationary bike for about 10 mins before she needs a stretching break but can bike total 30 mins.  1/5- The patient reports improvements overall and tolerating [employer] 40 mins every other day again.     Back Pain  This is a chronic problem. The pain is present in the lumbar spine, sacro-iliac and thoracic spine.     Past Medical History:   Diagnosis Date   • Allergic 2000   • Headache(784.0) 2000      History reviewed. No pertinent surgical history.  The following portions of the patient's history were reviewed and updated as appropriate: allergies, past family history, past medical history, past social history, past surgical history, and problem list.  Review of Systems   Musculoskeletal:  Positive for back pain and myalgias.     Physical Exam  Musculoskeletal:      Thoracic back: Spasms and tenderness present. No swelling, edema, deformity, signs of trauma or lacerations. Decreased range of motion.      Lumbar back:  Spasms and tenderness present. No swelling, edema, deformity, signs of trauma, lacerations or bony tenderness. Decreased range of motion. Positive right straight leg raise test. No scoliosis.        Back:    Neurological:      Gait: Gait is intact.      Deep Tendon Reflexes: Reflexes are normal and symmetric.       SOFT TISSUE ASSESSMENT Hypertonicity and tenderness palpated B T10-S1 erector spinae, hip flexor, glute med/min, QL, hamstring JOINT RESTRICTIONS: T10-S1 and R SIJ ORTHO: SI jt point tenderness: +; Regina unremarkable for centralization/peripheralization; erwin's, iliac compression, thigh thrust elicit lbp in R/L SIJ; prone femoral nerve stretch neg for upper lumbar neural tension, elicits R/L SIJ stiffness; sitting root elicits no lbp on R/L; slump test elicits no neural tension R/L    Return in about 4 weeks (around 2/2/2024) for Recheck.

## 2024-01-17 DIAGNOSIS — L70.0 ACNE VULGARIS: ICD-10-CM

## 2024-01-17 NOTE — TELEPHONE ENCOUNTER
Patient was last seen 04/27/23 and last potassium was done 04/27/23 ( normal). Patient has an schedule appt with Dr Waldrop 01/29/24    Please review

## 2024-01-18 RX ORDER — SPIRONOLACTONE 50 MG/1
TABLET, FILM COATED ORAL
Qty: 90 TABLET | Refills: 0 | Status: SHIPPED | OUTPATIENT
Start: 2024-01-18

## 2024-01-29 ENCOUNTER — OFFICE VISIT (OUTPATIENT)
Dept: DERMATOLOGY | Facility: CLINIC | Age: 39
End: 2024-01-29
Payer: COMMERCIAL

## 2024-01-29 ENCOUNTER — PROCEDURE VISIT (OUTPATIENT)
Age: 39
End: 2024-01-29
Payer: COMMERCIAL

## 2024-01-29 VITALS
WEIGHT: 147 LBS | SYSTOLIC BLOOD PRESSURE: 122 MMHG | HEART RATE: 70 BPM | OXYGEN SATURATION: 99 % | BODY MASS INDEX: 24.49 KG/M2 | HEIGHT: 65 IN | DIASTOLIC BLOOD PRESSURE: 68 MMHG

## 2024-01-29 VITALS — WEIGHT: 147.8 LBS | BODY MASS INDEX: 24.62 KG/M2 | TEMPERATURE: 96.8 F | HEIGHT: 65 IN

## 2024-01-29 DIAGNOSIS — L70.0 ACNE VULGARIS: ICD-10-CM

## 2024-01-29 DIAGNOSIS — M99.04 SEGMENTAL DYSFUNCTION OF SACRAL REGION: ICD-10-CM

## 2024-01-29 DIAGNOSIS — L82.1 SEBORRHEIC KERATOSIS: Primary | ICD-10-CM

## 2024-01-29 DIAGNOSIS — M99.02 SEGMENTAL DYSFUNCTION OF THORACIC REGION: ICD-10-CM

## 2024-01-29 DIAGNOSIS — M54.59 MECHANICAL LOW BACK PAIN: ICD-10-CM

## 2024-01-29 DIAGNOSIS — M99.03 SEGMENTAL DYSFUNCTION OF LUMBAR REGION: Primary | ICD-10-CM

## 2024-01-29 PROCEDURE — 98941 CHIROPRACT MANJ 3-4 REGIONS: CPT | Performed by: CHIROPRACTOR

## 2024-01-29 PROCEDURE — 99214 OFFICE O/P EST MOD 30 MIN: CPT | Performed by: DERMATOLOGY

## 2024-01-29 PROCEDURE — 97110 THERAPEUTIC EXERCISES: CPT | Performed by: CHIROPRACTOR

## 2024-01-29 RX ORDER — SPIRONOLACTONE 50 MG/1
TABLET, FILM COATED ORAL
Qty: 90 TABLET | Refills: 3 | Status: SHIPPED | OUTPATIENT
Start: 2024-01-29 | End: 2025-01-28

## 2024-01-29 NOTE — PROGRESS NOTES
Diagnoses and all orders for this visit:    Segmental dysfunction of lumbar region    Segmental dysfunction of sacral region    Mechanical low back pain    Segmental dysfunction of thoracic region       ASSESSMENT:  Pt's symptoms and exam findings consistent with mechanical lbp secondary to repetitive st/sp injury, exacerbated by postural/ergonomic stressors. Pt responded well to flexion biased stretches and manual mobilization of the affected spinal and myofascial tissues with increased ROM; trial of conservative tx recommended consisting of stretching, graded mobilization/manipulation of the affected spinal and myofascial jt dysfunction, postural/ergonomic education and take home stretches/exercises. If symptoms fail to improve with short trial of conservative care, appropriate imaging and referral will be coordinated.  - The pt is feeling better overall. Cycling indoor with no pain. Discharged    PROCEDURE CODES: 38601 and 99377    TREATMENT:  Fear avoidance behavior discussion; encouraged and reassured pt that natural course of condition is to improve over time with adherence to tx plan and home care strategies. Home care recommendations: avoid bed rest, walk (but avoid trails and uneven surfaces), gradual return to activity to tolerance (avoid anything that peripheralizes symptoms), call if symptoms peripheralize, worsen, or neurologic deficit progresses. Ther-ex: IASTM; discussed post procedure soreness and/or ecchymosis for up to 36 hrs, applied to affected mm hypertonicities; supine hamstring stretch, supine gluteal stretch, side laying QL stretch, single knee to chest stretch, hip flexor pin-and-stretch, alternating prone hip extension, glute bridge, transitional mvmt education, abdominal bracing; greater than 15 min spent performing above mentioned ther-ex to improve ROM/flexibility. Thoracic mobilization/manipulation: prone P-A mob, supine A-P manip; Lumbar mobilization/manipulation: diversified side  laying graded HVLA, flexion-traction; SIJ Manipulation/Mobilization: R/L SIJ HVLA - long axis distraction, hill drop table maneuver to affected SIJ    HPI:  Carmita Roque is a 38 y.o. female  Chief Complaint   Patient presents with   • Back Pain     Lower back pain-0     The patient is present with the office with lower back pain , that patient reports this is a chronic problem but had a flare up after helping a friend with a project that was lower to the ground. The onset was two weeks ago. The patient was taking 1 tablet of OTC Aleve. Hot showers, heating bad. Since onset pain is slightly lessened. 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  11/27- The patient reports she has improved with pain but is avoiding stationary bike.   12/11- The reports reportts she is able to bike on stationary bike for about 10 mins before she needs a stretching break but can bike total 30 mins.  1/5- The patient reports improvements overall and tolerating Peloton 40 mins every other day again.   1/29- The pt is feeling 0/10    Back Pain  This is a chronic problem. The pain is present in the lumbar spine, sacro-iliac and thoracic spine.     Past Medical History:   Diagnosis Date   • Allergic 2000   • Headache(784.0) 2000      History reviewed. No pertinent surgical history.  The following portions of the patient's history were reviewed and updated as appropriate: allergies, past family history, past medical history, past social history, past surgical history, and problem list.  Review of Systems   Musculoskeletal:  Positive for back pain and myalgias.     Physical Exam  Musculoskeletal:      Thoracic back: Spasms and tenderness present. No swelling, edema, deformity, signs of trauma or lacerations. Decreased range of motion.      Lumbar back: Spasms and tenderness present. No swelling, edema, deformity, signs of trauma, lacerations or bony tenderness. Decreased range of motion. Positive right straight  leg raise test. No scoliosis.        Back:    Neurological:      Gait: Gait is intact.      Deep Tendon Reflexes: Reflexes are normal and symmetric.       SOFT TISSUE ASSESSMENT Hypertonicity and tenderness palpated B T10-S1 erector spinae, hip flexor, glute med/min, QL, hamstring JOINT RESTRICTIONS: T10-S1 and R SIJ ORTHO: SI jt point tenderness: +; Regina unremarkable for centralization/peripheralization; erwin's, iliac compression, thigh thrust elicit lbp in R/L SIJ; prone femoral nerve stretch neg for upper lumbar neural tension, elicits R/L SIJ stiffness; sitting root elicits no lbp on R/L; slump test elicits no neural tension R/L    Return if symptoms worsen or fail to improve.

## 2024-01-29 NOTE — PROGRESS NOTES
"[facility] Note     Patient Name: Carmita Roque  Encounter Date: 1/29/24     Have you been cared for by a [facility] Dermatologist in the last 3 years and, if so, which description applies to you?    Yes.  I have been here within the last 3 years, and my medical history has NOT changed since that time.  I am FEMALE/of child-bearing potential.    REVIEW OF SYSTEMS:  Have you recently had or currently have any of the following? No changes in my recent health.   PAST MEDICAL HISTORY:  Have you personally ever had or currently have any of the following?  If \"YES,\" then please provide more detail. No changes in my medical history.   HISTORY OF IMMUNOSUPPRESSION: Do you have a history of any of the following:  Systemic Immunosuppression such as Diabetes, Biologic or Immunotherapy, Chemotherapy, Organ Transplantation, Bone Marrow Transplantation?  No     Answering \"YES\" requires the addition of the dotphrase \"IMMUNOSUPPRESSED\" as the first diagnosis of the patient's visit.   FAMILY HISTORY:  Any \"first degree relatives\" (parent, brother, sister, or child) with the following?    No changes in my family's known health.   PATIENT EXPERIENCE:    Do you want the Dermatologist to perform a COMPLETE skin exam today including a clinical examination under the \"bra and underwear\" areas?  NO  If necessary, do we have your permission to call and leave a detailed message on your Preferred Phone number that includes your specific medical information?  Yes      Allergies   Allergen Reactions    Amoxicillin Rash      Current Outpatient Medications:     Cetirizine HCl (ZYRTEC PO), Take by mouth, Disp: , Rfl:     spironolactone (ALDACTONE) 50 mg tablet, TAKE 1 TABLET BY MOUTH DAILY. NOT FOR USE DURING PREGNANCE. AVOID EXCESSIVE DIETARY POTASSIUM, Disp: 90 tablet, Rfl: 0    benzonatate (TESSALON PERLES) 100 mg capsule, Take 1 capsule (100 mg total) by mouth 3 (three) times a day as needed for cough (Patient not taking: " Reported on 4/27/2023), Disp: 20 capsule, Rfl: 0    benzonatate (TESSALON) 200 MG capsule, Take 1 capsule (200 mg total) by mouth 3 (three) times a day as needed for cough (Patient not taking: Reported on 11/13/2023), Disp: 30 capsule, Rfl: 0    loratadine (CLARITIN) 10 mg tablet, Take 10 mg by mouth daily (Patient not taking: Reported on 4/27/2023), Disp: , Rfl:     methylPREDNISolone 4 MG tablet therapy pack, Use as directed on package (Patient not taking: Reported on 11/13/2023), Disp: 21 tablet, Rfl: 0    predniSONE 20 mg tablet, 1 tab twice daily for 5 days, then 1 tab daily for 5 days. (Patient not taking: Reported on 4/27/2023), Disp: 15 tablet, Rfl: 0          Whom besides the patient is providing clinical information about today's encounter?   NO ADDITIONAL HISTORIAN (patient alone provided history)    Physical Exam and Assessment/Plan by Diagnosis:      ACNE- HORMONAL    Physical Exam:  Anatomic Location Affected:  Currently clear  Morphological Description: No acne visible today at visit  Pertinent Positives:  Pertinent Negatives:    Additional History of Present Condition:  Patient present for acne follow up, uses Cetaphil moisturizers and spironolactone 50 mg once a day. Has been on it for 6 months now- no side effects. Patient is satisfied with outcome and here for refill today. Gets one pimple every 1-2 months. No longer using any topicals. She has a Kyleena IUD in place.     History notable for acne that flares around menses  Denies liver disease, kidney disease, hyperkalemia  Educated that unless over age 45 or with a history of renal/liver disease, hyperkalemia, or medication interactions, evidence-based medicine does not support routine lab studies in patients on spironolactone.     Discussed that treatment is directed at improving skin appearance and reducing the likelihood of scarring. Discussed theraputic ladder including topical OTC treatments, topical prescriptions, and oral medications.  "Discussed side effects as noted below.    Plan today:      AM:  Gentle cleanser (Cerave, Cetaphil, Vanicream are great options)  SPF 30 or greater with moisturizer         PM:  Gentle cleanser, such as CeraVe, Cetaphil or La Roche Posay  Non-comedogenic moisturizer such as CeraVe, Cetaphil or Vanicream  Can continue spironolactone 50 mg PO Daily       She denies a history of liver or kidney disease or hyperkalemia and eats a balanced diet.   The risks of spironolactone were reviewed including birth defects, mood change, irritability, GI upset, headache, liver injury, hyperkalemia, spotting/menstrual irregularity, breast tenderness, hypotension, dizziness/lightheadedness/orthostatic symptoms, etc. Educated to limit high potassium foods like bananas, avocados and coconut water while on this medication.     She will maintain birth control or pregnancy prevention practices while taking it and is aware she cannot safely get pregnant on this medication.   For any concerns, she will stop the medication and call the office.     FOLLOW UP IN 12 MONTHS, sooner for concerns.        Call with any questions or concerns before next follow-up visit; do not stop medications abruptly without consulting provider. Call our office at [phone] (SKIN).  It is better to be safe than to be sorry!    SEBORRHEIC KERATOSIS; NON-INFLAMED    Physical Exam:  Anatomic Location Affected:  Right cheek  Morphological Description:  Flat and raised, waxy, smooth to warty textured, yellow to brownish-grey to dark brown to blackish, discrete, \"stuck-on\" appearing papules.  Pertinent Positives:  Pertinent Negatives:    Additional History of Present Condition:  Patient reports new bumps on the skin.  Denies itch, burn, pain, bleeding or ulceration.  Present constantly; nothing seems to make it worse or better.  No prior treatment.      Assessment and Plan:  Based on a thorough discussion of this condition and the management approach to it (including " "a comprehensive discussion of the known risks, side effects and potential benefits of treatment), the patient (family) agrees to implement the following specific plan:  Reassured benign    Seborrheic Keratosis  A seborrheic keratosis is a harmless warty spot that appears during adult life as a common sign of skin aging.  Seborrheic keratoses can arise on any area of skin, covered or uncovered, with the usual exception of the palms and soles. They do not arise from mucous membranes. Seborrheic keratoses can have highly variable appearance.      Seborrheic keratoses are extremely common. It has been estimated that over 90% of adults over the age of 60 years have one or more of them. They occur in males and females of all races, typically beginning to erupt in the 30s or 40s. They are uncommon under the age of 20 years.  The precise cause of seborrhoeic keratoses is not known.  Seborrhoeic keratoses are considered degenerative in nature. As time goes by, seborrheic keratoses tend to become more numerous. Some people inherit a tendency to develop a very large number of them; some people may have hundreds of them.    The name \"seborrheic keratosis\" is misleading, because these lesions are not limited to a seborrhoeic distribution (scalp, mid-face, chest, upper back), nor are they formed from sebaceous glands, nor are they associated with sebum -- which is greasy.  Seborrheic keratosis may also be called \"SK,\" \"Seb K,\" \"basal cell papilloma,\" \"senile wart,\" or \"barnacle.\"      Researchers have noted:  Eruptive seborrhoeic keratoses can follow sunburn or dermatitis  Skin friction may be the reason they appear in body folds  Viral cause (e.g., human papillomavirus) seems unlikely  Stable and clonal mutations or activation of FRFR3, PIK3CA, WILFRED, AKT1 and EGFR genes are found in seborrhoeic keratoses  Seborrhoeic keratosis can arise from solar lentigo  FRFR3 mutations also arise in solar lentigines. These mutations are " "associated with increased age and location on the head and neck, suggesting a role of ultraviolet radiation in these lesions  Seborrheic keratoses do not harbour tumour suppressor gene mutations  Epidermal growth factor receptor inhibitors, which are used to treat some cancers, often result in an increase in verrucal (warty) keratoses.    There is no easy way to remove multiple lesions on a single occasion.  Unless a specific lesion is \"inflamed\" and is causing pain or stinging/burning or is bleeding, most insurance companies do not authorize treatment.       Scribe Attestation      I,:  Devora Arriaga am acting as a scribe while in the presence of the attending physician.:       I,:  Lauren Waldrop MD personally performed the services described in this documentation    as scribed in my presence.:             "

## 2024-05-22 ENCOUNTER — TELEPHONE (OUTPATIENT)
Dept: FAMILY MEDICINE CLINIC | Facility: CLINIC | Age: 39
End: 2024-05-22

## 2024-08-12 ENCOUNTER — OFFICE VISIT (OUTPATIENT)
Dept: FAMILY MEDICINE CLINIC | Facility: CLINIC | Age: 39
End: 2024-08-12
Payer: COMMERCIAL

## 2024-08-12 VITALS
SYSTOLIC BLOOD PRESSURE: 110 MMHG | DIASTOLIC BLOOD PRESSURE: 78 MMHG | WEIGHT: 141 LBS | OXYGEN SATURATION: 99 % | BODY MASS INDEX: 23.46 KG/M2 | HEART RATE: 89 BPM

## 2024-08-12 DIAGNOSIS — Z23 ENCOUNTER FOR IMMUNIZATION: ICD-10-CM

## 2024-08-12 DIAGNOSIS — Z11.1 ENCOUNTER FOR PPD TEST: ICD-10-CM

## 2024-08-12 DIAGNOSIS — Z00.00 ANNUAL PHYSICAL EXAM: Primary | ICD-10-CM

## 2024-08-12 PROCEDURE — 90715 TDAP VACCINE 7 YRS/> IM: CPT

## 2024-08-12 PROCEDURE — 90471 IMMUNIZATION ADMIN: CPT

## 2024-08-12 PROCEDURE — 99395 PREV VISIT EST AGE 18-39: CPT | Performed by: NURSE PRACTITIONER

## 2024-08-12 PROCEDURE — 86580 TB INTRADERMAL TEST: CPT

## 2024-08-12 NOTE — PROGRESS NOTES
Adult Annual Physical  Name: Carmita Roque      : 1985      MRN: [id number]  Encounter Provider: LU Flores  Encounter Date: 2024   Encounter department: [facility]    Assessment & Plan   1. Annual physical exam  -     Comprehensive metabolic panel  -     Lipid Panel with Direct LDL reflex; Future  -     Lipid Panel with Direct LDL reflex  2. Encounter for PPD test  -     TB Skin Test  3. Encounter for immunization  -     TDAP VACCINE GREATER THAN OR EQUAL TO 8YO IM    Immunizations and preventive care screenings were discussed with patient today. Appropriate education was printed on patient's after visit summary.    Counseling:  Alcohol/drug use: discussed moderation in alcohol intake, the recommendations for healthy alcohol use, and avoidance of illicit drug use.  Dental Health: discussed importance of regular tooth brushing, flossing, and dental visits.  Injury prevention: discussed safety/seat belts, safety helmets, smoke detectors, carbon dioxide detectors, and smoking near bedding or upholstery.  Sexual health: discussed sexually transmitted diseases, partner selection, use of condoms, avoidance of unintended pregnancy, and contraceptive alternatives.  Exercise: the importance of regular exercise/physical activity was discussed. Recommend exercise 3-5 times per week for at least 30 minutes.          History of Present Illness     Adult Annual Physical:  Patient presents for annual physical. Here for annual physical. Needs Tdapand PPD for work. No problems/concerns..     Diet and Physical Activity:  - Diet/Nutrition: well balanced diet.  - Exercise: 3-4 times a week on average.    Depression Screening:  - PHQ-2 Score: 0    General Health:  - Sleep: sleeps well.  - Hearing: normal hearing bilateral ears.  - Vision: no vision problems.    /GYN Health:  - Follows with GYN: yes.   - Menopause: premenopausal.   - Last menstrual cycle: 2024.   -  Contraception: IUD placement.      Advanced Care Planning:  - Has an advanced directive?: no    - Has a durable medical POA?: no    - ACP document given to patient?: yes      Review of Systems   Constitutional:  Negative for activity change, appetite change, chills, diaphoresis, fatigue, fever and unexpected weight change.   HENT:  Negative for congestion, dental problem, drooling, ear discharge, ear pain, facial swelling, hearing loss, mouth sores, nosebleeds, postnasal drip, rhinorrhea, sinus pressure, sinus pain, sneezing, sore throat, tinnitus, trouble swallowing and voice change.    Eyes:  Negative for photophobia, pain, discharge, redness, itching and visual disturbance.   Respiratory:  Negative for apnea, cough, choking, chest tightness, shortness of breath, wheezing and stridor.    Cardiovascular:  Negative for chest pain, palpitations and leg swelling.   Gastrointestinal:  Negative for abdominal distention, abdominal pain, anal bleeding, blood in stool, constipation, diarrhea, nausea, rectal pain and vomiting.   Endocrine: Negative for cold intolerance, heat intolerance, polydipsia, polyphagia and polyuria.   Genitourinary:  Negative for decreased urine volume, difficulty urinating, dyspareunia, dysuria, enuresis, flank pain, frequency, genital sores, hematuria, menstrual problem, pelvic pain, urgency, vaginal bleeding, vaginal discharge and vaginal pain.   Musculoskeletal:  Negative for arthralgias, back pain, gait problem, joint swelling, myalgias, neck pain and neck stiffness.   Skin:  Negative for color change, pallor, rash and wound.   Allergic/Immunologic: Negative for food allergies and immunocompromised state.   Neurological:  Negative for dizziness, tremors, seizures, syncope, facial asymmetry, speech difficulty, weakness, light-headedness, numbness and headaches.   Hematological:  Negative for adenopathy. Does not bruise/bleed easily.   Psychiatric/Behavioral:  Negative for agitation, behavioral  problems, confusion, decreased concentration, dysphoric mood, hallucinations, self-injury, sleep disturbance and suicidal ideas. The patient is not nervous/anxious and is not hyperactive.          Objective     /78 (BP Location: Left arm, Patient Position: Sitting, Cuff Size: Standard)   Pulse 89   Wt 64 kg (141 lb)   SpO2 99%   BMI 23.46 kg/m²     Physical Exam  Vitals and nursing note reviewed.   Constitutional:       General: She is not in acute distress.     Appearance: Normal appearance. She is normal weight. She is not ill-appearing, toxic-appearing or diaphoretic.   HENT:      Head: Normocephalic.      Right Ear: Tympanic membrane, ear canal and external ear normal. There is no impacted cerumen.      Left Ear: Tympanic membrane, ear canal and external ear normal. There is no impacted cerumen.      Nose: Nose normal. No congestion or rhinorrhea.      Mouth/Throat:      Mouth: Mucous membranes are moist.      Pharynx: Oropharynx is clear. No oropharyngeal exudate or posterior oropharyngeal erythema.   Eyes:      General: No scleral icterus.        Right eye: No discharge.         Left eye: No discharge.      Extraocular Movements: Extraocular movements intact.      Conjunctiva/sclera: Conjunctivae normal.      Pupils: Pupils are equal, round, and reactive to light.   Neck:      Vascular: No carotid bruit.   Cardiovascular:      Rate and Rhythm: Normal rate and regular rhythm.      Pulses: Normal pulses.      Heart sounds: Normal heart sounds. No murmur heard.     No friction rub. No gallop.   Pulmonary:      Effort: Pulmonary effort is normal. No respiratory distress.      Breath sounds: Normal breath sounds. No stridor. No wheezing, rhonchi or rales.   Chest:      Chest wall: No tenderness.   Abdominal:      General: Abdomen is flat. Bowel sounds are normal. There is no distension.      Palpations: Abdomen is soft. There is no mass.      Tenderness: There is no abdominal tenderness. There is no right  CVA tenderness, left CVA tenderness, guarding or rebound.      Hernia: No hernia is present.   Musculoskeletal:         General: No swelling, tenderness, deformity or signs of injury. Normal range of motion.      Cervical back: Normal range of motion and neck supple. No rigidity or tenderness. No muscular tenderness.      Right lower leg: No edema.      Left lower leg: No edema.   Lymphadenopathy:      Cervical: No cervical adenopathy.   Skin:     General: Skin is warm.      Capillary Refill: Capillary refill takes less than 2 seconds.      Coloration: Skin is not jaundiced or pale.      Findings: No bruising, erythema, lesion or rash.   Neurological:      General: No focal deficit present.      Mental Status: She is alert and oriented to person, place, and time.      Cranial Nerves: No cranial nerve deficit.      Sensory: No sensory deficit.      Motor: No weakness.      Coordination: Coordination normal.      Gait: Gait normal.      Deep Tendon Reflexes: Reflexes normal.   Psychiatric:         Mood and Affect: Mood normal.         Behavior: Behavior normal.         Thought Content: Thought content normal.         Judgment: Judgment normal.

## 2024-08-13 ENCOUNTER — TELEPHONE (OUTPATIENT)
Dept: FAMILY MEDICINE CLINIC | Facility: CLINIC | Age: 39
End: 2024-08-13

## 2024-08-13 ENCOUNTER — TELEPHONE (OUTPATIENT)
Dept: OTHER | Facility: OTHER | Age: 39
End: 2024-08-13

## 2024-08-13 NOTE — TELEPHONE ENCOUNTER
Patient called today regarding her Nurse Appointment for tomorrow at 3:00 pm, needs to be rescheduled for 4:00 pm because the TB results wont be ready until 4:00 pm. Please call Patient back to discuss / Reschedule.

## 2024-08-14 ENCOUNTER — CLINICAL SUPPORT (OUTPATIENT)
Dept: FAMILY MEDICINE CLINIC | Facility: CLINIC | Age: 39
End: 2024-08-14

## 2024-08-14 DIAGNOSIS — Z11.1 ENCOUNTER FOR PPD SKIN TEST READING: Primary | ICD-10-CM

## 2024-08-14 LAB
INDURATION: 0 MM
TB SKIN TEST: NEGATIVE

## 2024-08-14 NOTE — TELEPHONE ENCOUNTER
On appointment notes mentions around 4. Tried to call pt, vm message just kept playing. Could not leave a message.

## 2024-08-18 LAB
CHOLEST SERPL-MCNC: 196 MG/DL
CHOLEST/HDLC SERPL: 3.1 (CALC)
HDLC SERPL-MCNC: 63 MG/DL
LDLC SERPL CALC-MCNC: 108 MG/DL (CALC)
NONHDLC SERPL-MCNC: 133 MG/DL (CALC)
TRIGL SERPL-MCNC: 132 MG/DL

## 2024-10-23 ENCOUNTER — TELEPHONE (OUTPATIENT)
Age: 39
End: 2024-10-23

## 2024-10-23 NOTE — TELEPHONE ENCOUNTER
Patient is refill on spironolactone (ALDACTONE) 50 mg tablet. Patient changed pharmacies.   [facility] - [facility] - [facility]

## 2024-10-24 ENCOUNTER — TELEPHONE (OUTPATIENT)
Dept: DERMATOLOGY | Facility: CLINIC | Age: 39
End: 2024-10-24

## 2024-10-24 DIAGNOSIS — L70.0 ACNE VULGARIS: Primary | ICD-10-CM

## 2024-10-24 RX ORDER — SPIRONOLACTONE 50 MG/1
TABLET, FILM COATED ORAL
Qty: 90 TABLET | Refills: 1 | Status: SHIPPED | OUTPATIENT
Start: 2024-10-24

## 2024-10-24 NOTE — TELEPHONE ENCOUNTER
I reached out to the patient to clarify why she needs Spironolactone 50 mg refill per Benjie Jones. I left a vm to call me back at my extension number.

## 2024-10-24 NOTE — TELEPHONE ENCOUNTER
Dr. Waldrop: After multiple attempts, I had a discussion with the patient with the patient who requested a new prescription of spironolactone 50 mg to be sent to [facility] in [place], [place]. She mentioned that due to start a new job, her prescription plan now requires providers to send prescription to [facility]. Would it be possible to send a new prescription along with refills?

## 2024-12-05 ENCOUNTER — TELEPHONE (OUTPATIENT)
Dept: DERMATOLOGY | Facility: CLINIC | Age: 39
End: 2024-12-05

## 2024-12-05 NOTE — TELEPHONE ENCOUNTER
LMOM that 02/03/2025 appt w/Dr Waldrop was cx, due to a change in her schedule, she's not going to the [place] office anymore and not doing Virtual Visits anymore, please give the office a call to r/s.

## 2024-12-27 ENCOUNTER — OFFICE VISIT (OUTPATIENT)
Dept: DERMATOLOGY | Facility: CLINIC | Age: 39
End: 2024-12-27
Payer: COMMERCIAL

## 2024-12-27 VITALS — HEIGHT: 65 IN | BODY MASS INDEX: 24.16 KG/M2 | TEMPERATURE: 98.5 F | WEIGHT: 145 LBS

## 2024-12-27 DIAGNOSIS — L70.0 ACNE VULGARIS: Primary | ICD-10-CM

## 2024-12-27 PROCEDURE — 99214 OFFICE O/P EST MOD 30 MIN: CPT

## 2024-12-27 RX ORDER — SPIRONOLACTONE 50 MG/1
TABLET, FILM COATED ORAL
Qty: 90 TABLET | Refills: 3 | Status: SHIPPED | OUTPATIENT
Start: 2024-12-27

## 2024-12-27 NOTE — PROGRESS NOTES
"[facility] Note     Patient Name: Carmita Roque  Encounter Date: 12/27/2024     Have you been cared for by a [facility] Dermatologist in the last 3 years and, if so, which description applies to you?    Yes.  I have been here within the last 3 years, and my medical history has NOT changed since that time.  I am FEMALE/of child-bearing potential.    REVIEW OF SYSTEMS:  Have you recently had or currently have any of the following? No changes in my recent health.   PAST MEDICAL HISTORY:  Have you personally ever had or currently have any of the following?  If \"YES,\" then please provide more detail. No changes in my medical history.   HISTORY OF IMMUNOSUPPRESSION: Do you have a history of any of the following:  Systemic Immunosuppression such as Diabetes, Biologic or Immunotherapy, Chemotherapy, Organ Transplantation, Bone Marrow Transplantation or Prednisone?  No     Answering \"YES\" requires the addition of the dotphrase \"IMMUNOSUPPRESSED\" as the first diagnosis of the patient's visit.   FAMILY HISTORY:  Any \"first degree relatives\" (parent, brother, sister, or child) with the following?    No changes in my family's known health.   PATIENT EXPERIENCE:    Do you want the Dermatologist to perform a COMPLETE skin exam today including a clinical examination under the \"bra and underwear\" areas?  NO  If necessary, do we have your permission to call and leave a detailed message on your Preferred Phone number that includes your specific medical information?  Yes      Allergies   Allergen Reactions   • Amoxicillin Rash      Current Outpatient Medications:   •  Cetirizine HCl (ZYRTEC PO), Take by mouth, Disp: , Rfl:   •  Levonorgestrel (KYLEENA IU), by Intrauterine route, Disp: , Rfl:   •  spironolactone (ALDACTONE) 50 mg tablet, Take 1 pill (50 mg) once daily. STOP IMMEDIATELY IF YOU BECOME UNINTENTIONALLY PREGNANT or if you decide to plan for pregnancy. Avoid large amounts of high potassium food or " "beverages while taking this medication., Disp: 90 tablet, Rfl: 3          Whom besides the patient is providing clinical information about today's encounter?   NO ADDITIONAL HISTORIAN (patient alone provided history)    Physical Exam and Assessment/Plan by Diagnosis:    ACNE VULGARIS (\"COMMON ACNE\")    Physical Exam:  Anatomic Location Affected:  face  Morphological Description: clear today  Pertinent Positives:  Pertinent Negatives:    Additional History of Present Condition:  Patient was last seen in Jan 2024 by Dr. Waldrop in office for acne-she continues to do well with use of Cetaphil foaming cleanser and 50mg spironolactone daily. She does experience minor breakouts just around her menstrual cycle but would like to continue her current regimen.       History notable for acne that flares around menses. Discussed that history and physical are consistent with hormonal acne   Educated that hormonal acne does particularly well with medications that target hormones, including spironolactone and OCP. Patient has IUD and no intentions of becoming pregnant.   Denies family history of breast cancer, personal history of low blood pressure, liver disease, kidney disease, hyperkalemia    Discussed that treatment is directed at improving skin appearance and reducing the likelihood of scarring. Discussed theraputic ladder including topical OTC treatments, topical prescriptions, and oral medications. Discussed side effects as noted below       Plan:  Continue spironolactone 50 mg PO daily. S/E reviewed including menstrual irregularity, breast tenderness, headaches, GI upset, K+ retention, palpitations, teratogenicity/feminization of male fetus. Handout provided.  Continue with over the counter skin regimen of Cetaphil gentle face wash, moisturizer and SPF 30 or above    Follow up annually, sooner for concerns      Call with any questions or concerns before next follow-up visit; do not stop medications abruptly without " consulting provider. Call our office at [phone] (SKIN).  It is better to be safe than to be sorry!       Scribe Attestation    I,:  Karina Lincoln MA am acting as a scribe while in the presence of the attending physician.:       I,:  Carmella Nolan PA-C personally performed the services described in this documentation    as scribed in my presence.:

## 2025-03-13 NOTE — PATIENT INSTRUCTIONS
"Patient Education     Routine physical for adults   The Basics   Written by the doctors and editors at [facility]   What is a physical? -- A physical is a routine visit, or \"check-up,\" with your doctor. You might also hear it called a \"wellness visit\" or \"preventive visit.\"  During each visit, the doctor will:   Ask about your physical and mental health   Ask about your habits, behaviors, and lifestyle   Do an exam   Give you vaccines if needed   Talk to you about any medicines you take   Give advice about your health   Answer your questions  Getting regular check-ups is an important part of taking care of your health. It can help your doctor find and treat any problems you have. But it's also important for preventing health problems.  A routine physical is different from a \"sick visit.\" A sick visit is when you see a doctor because of a health concern or problem. Since physicals are scheduled ahead of time, you can think about what you want to ask the doctor.  How often should I get a physical? -- It depends on your age and health. In general, for people age 21 years and older:   If you are younger than 50 years, you might be able to get a physical every 3 years.   If you are 50 years or older, your doctor might recommend a physical every year.  If you have an ongoing health condition, like diabetes or high blood pressure, your doctor will probably want to see you more often.  What happens during a physical? -- In general, each visit will include:   Physical exam - The doctor or nurse will check your height, weight, heart rate, and blood pressure. They will also look at your eyes and ears. They will ask about how you are feeling and whether you have any symptoms that bother you.   Medicines - It's a good idea to bring a list of all the medicines you take to each doctor visit. Your doctor will talk to you about your medicines and answer any questions. Tell them if you are having any side effects that bother you. You " "should also tell them if you are having trouble paying for any of your medicines.   Habits and behaviors - This includes:   Your diet   Your exercise habits   Whether you smoke, drink alcohol, or use drugs   Whether you are sexually active   Whether you feel safe at home  Your doctor will talk to you about things you can do to improve your health and lower your risk of health problems. They will also offer help and support. For example, if you want to quit smoking, they can give you advice and might prescribe medicines. If you want to improve your diet or get more physical activity, they can help you with this, too.   Lab tests, if needed - The tests you get will depend on your age and situation. For example, your doctor might want to check your:   Cholesterol   Blood sugar   Iron level   Vaccines - The recommended vaccines will depend on your age, health, and what vaccines you already had. Vaccines are very important because they can prevent certain serious or deadly infections.   Discussion of screening - \"Screening\" means checking for diseases or other health problems before they cause symptoms. Your doctor can recommend screening based on your age, risk, and preferences. This might include tests to check for:   Cancer, such as breast, prostate, cervical, ovarian, colorectal, prostate, lung, or skin cancer   Sexually transmitted infections, such as chlamydia and gonorrhea   Mental health conditions like depression and anxiety  Your doctor will talk to you about the different types of screening tests. They can help you decide which screenings to have. They can also explain what the results might mean.   Answering questions - The physical is a good time to ask the doctor or nurse questions about your health. If needed, they can refer you to other doctors or specialists, too.  Adults older than 65 years often need other care, too. As you get older, your doctor will talk to you about:   How to prevent falling at " No home   Hearing or vision tests   Memory testing   How to take your medicines safely   Making sure that you have the help and support you need at home  All topics are updated as new evidence becomes available and our peer review process is complete.  This topic retrieved from [employer] on: May 02, 2024.  Topic 561096 Version 1.0  Release: 32.4.3 - C32.122  © 2024 [employer] and/or its affiliates. All rights reserved.  Consumer Information Use and Disclaimer   Disclaimer: This generalized information is a limited summary of diagnosis, treatment, and/or medication information. It is not meant to be comprehensive and should be used as a tool to help the user understand and/or assess potential diagnostic and treatment options. It does NOT include all information about conditions, treatments, medications, side effects, or risks that may apply to a specific patient. It is not intended to be medical advice or a substitute for the medical advice, diagnosis, or treatment of a health care provider based on the health care provider's examination and assessment of a patient's specific and unique circumstances. Patients must speak with a health care provider for complete information about their health, medical questions, and treatment options, including any risks or benefits regarding use of medications. This information does not endorse any treatments or medications as safe, effective, or approved for treating a specific patient. [employer], [employer] and its affiliates disclaim any warranty or liability relating to this information or the use thereof.The use of this information is governed by the Terms of Use, available at https://www.woltersKILTRuwer.com/en/know/clinical-effectiveness-terms. 2024© [employer], [employer]. and its affiliates and/or licensors. All rights reserved.  Copyright   © 2024 [employer] and/or its affiliates. All rights reserved.

## 2025-07-22 ENCOUNTER — HOSPITAL ENCOUNTER (OUTPATIENT)
Age: 40
Discharge: HOME | End: 2025-07-22
Attending: PHYSICIAN ASSISTANT
Payer: COMMERCIAL

## 2025-07-22 VITALS — WEIGHT: 145 LBS | HEIGHT: 65 IN | BODY MASS INDEX: 24.16 KG/M2

## 2025-07-22 DIAGNOSIS — Z12.31 OTHER SCREENING MAMMOGRAM: ICD-10-CM

## 2025-07-22 PROCEDURE — 77067 SCR MAMMO BI INCL CAD: CPT

## 2025-07-22 PROCEDURE — 77063 BREAST TOMOSYNTHESIS BI: CPT

## 2025-07-28 ENCOUNTER — TELEPHONE (OUTPATIENT)
Dept: DERMATOLOGY | Facility: CLINIC | Age: 40
End: 2025-07-28

## 2025-08-01 ENCOUNTER — OB ABSTRACT (OUTPATIENT)
Age: 40
End: 2025-08-01

## 2025-08-04 ENCOUNTER — ANNUAL EXAM (OUTPATIENT)
Age: 40
End: 2025-08-04
Payer: COMMERCIAL

## 2025-08-04 VITALS
WEIGHT: 146.8 LBS | SYSTOLIC BLOOD PRESSURE: 118 MMHG | HEIGHT: 65 IN | DIASTOLIC BLOOD PRESSURE: 70 MMHG | BODY MASS INDEX: 24.46 KG/M2

## 2025-08-04 DIAGNOSIS — Z12.31 SCREENING MAMMOGRAM FOR BREAST CANCER: ICD-10-CM

## 2025-08-04 DIAGNOSIS — Z11.51 SCREENING FOR HPV (HUMAN PAPILLOMAVIRUS): ICD-10-CM

## 2025-08-04 DIAGNOSIS — Z01.419 WELL WOMAN EXAM WITH ROUTINE GYNECOLOGICAL EXAM: Primary | ICD-10-CM

## 2025-08-04 DIAGNOSIS — Z98.890 H/O LEEP: ICD-10-CM

## 2025-08-04 DIAGNOSIS — Z12.4 SCREENING FOR MALIGNANT NEOPLASM OF THE CERVIX: ICD-10-CM

## 2025-08-04 PROCEDURE — S0612 ANNUAL GYNECOLOGICAL EXAMINA: HCPCS | Performed by: NURSE PRACTITIONER

## 2025-08-04 PROCEDURE — G0476 HPV COMBO ASSAY CA SCREEN: HCPCS | Performed by: NURSE PRACTITIONER

## 2025-08-04 PROCEDURE — G0145 SCR C/V CYTO,THINLAYER,RESCR: HCPCS | Performed by: NURSE PRACTITIONER

## 2025-08-07 LAB
HPV HR 12 DNA CVX QL NAA+PROBE: NEGATIVE
HPV16 DNA CVX QL NAA+PROBE: NEGATIVE
HPV18 DNA CVX QL NAA+PROBE: NEGATIVE

## 2025-08-12 LAB
LAB AP GYN PRIMARY INTERPRETATION: NORMAL
LAB AP LMP: NORMAL
Lab: NORMAL